# Patient Record
Sex: FEMALE | Race: WHITE | NOT HISPANIC OR LATINO | Employment: UNEMPLOYED | URBAN - METROPOLITAN AREA
[De-identification: names, ages, dates, MRNs, and addresses within clinical notes are randomized per-mention and may not be internally consistent; named-entity substitution may affect disease eponyms.]

---

## 2017-01-17 ENCOUNTER — ALLSCRIPTS OFFICE VISIT (OUTPATIENT)
Dept: OTHER | Facility: OTHER | Age: 37
End: 2017-01-17

## 2017-08-07 ENCOUNTER — TRANSCRIBE ORDERS (OUTPATIENT)
Dept: ADMINISTRATIVE | Age: 37
End: 2017-08-07

## 2017-08-07 ENCOUNTER — APPOINTMENT (OUTPATIENT)
Dept: LAB | Age: 37
End: 2017-08-07

## 2017-08-07 DIAGNOSIS — Z00.8 HEALTH EXAMINATION IN POPULATION SURVEY: ICD-10-CM

## 2017-08-07 DIAGNOSIS — Z00.8 HEALTH EXAMINATION IN POPULATION SURVEY: Primary | ICD-10-CM

## 2017-08-07 LAB
CHOLEST SERPL-MCNC: 219 MG/DL (ref 50–200)
EST. AVERAGE GLUCOSE BLD GHB EST-MCNC: 91 MG/DL
HBA1C MFR BLD: 4.8 % (ref 4.2–6.3)
HDLC SERPL-MCNC: 47 MG/DL (ref 40–60)
LDLC SERPL CALC-MCNC: 146 MG/DL (ref 0–100)
TRIGL SERPL-MCNC: 132 MG/DL

## 2017-08-07 PROCEDURE — 80061 LIPID PANEL: CPT

## 2017-08-07 PROCEDURE — 36415 COLL VENOUS BLD VENIPUNCTURE: CPT

## 2017-08-07 PROCEDURE — 83036 HEMOGLOBIN GLYCOSYLATED A1C: CPT

## 2017-10-09 ENCOUNTER — ALLSCRIPTS OFFICE VISIT (OUTPATIENT)
Dept: OTHER | Facility: OTHER | Age: 37
End: 2017-10-09

## 2018-01-13 VITALS
DIASTOLIC BLOOD PRESSURE: 64 MMHG | WEIGHT: 152 LBS | SYSTOLIC BLOOD PRESSURE: 122 MMHG | BODY MASS INDEX: 24.43 KG/M2 | RESPIRATION RATE: 16 BRPM | HEART RATE: 80 BPM | TEMPERATURE: 98.3 F | HEIGHT: 66 IN

## 2018-01-13 NOTE — PROGRESS NOTES
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)   6  Need for immunization against influenza (V04 81) (Z23)   7  Need for tetanus booster (V03 7) (Z23)   8  PPD screening test (V74 1) (Z11 1)    Current Meds   1  Alesse (21) 0 1-20 MG-MCG TABS; Take 1 tablet daily Recorded    Allergies    1  No Known Drug Allergies    2   Seasonal    Plan  Need for MMR vaccine    · MMR    Future Appointments    Date/Time Provider Specialty Site   02/11/2016 03:40 PM Renay MONGE, Nurse Schedule  FAMILY [facility]     Signatures   Electronically signed by : Maci Carrillo, ; Jan 11 2016 11:32AM EST                       (Author)    Electronically signed by : Alec Carranza DO; Jan 11 2016 11:46AM EST                       (Author)

## 2018-01-13 NOTE — MISCELLANEOUS
Message  Return to work or school:        PLEASE EXCUSE PATIENT FROM WORK DUE TO [facility]  DR LOPEZ,DO/TML        Signatures   Electronically signed by : Veronica Bermudez, ; Apr 19 2016 12:14PM EST                       (Author)

## 2018-01-16 NOTE — PROGRESS NOTES
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)   7  Need for immunization against influenza (V04 81) (Z23)   8  Need for tetanus booster (V03 7) (Z23)   9  PPD screening test (V74 1) (Z11 1)    Current Meds   1  Alesse (21) 0 1-20 MG-MCG TABS; Take 1 tablet daily Recorded   2  Aviane 0 1-20 MG-MCG Oral Tablet; Take 1 tablet daily; Therapy: 70ZDP6721 to (Last Rx:54Cry0297)  Requested for: 24WZH4405 Ordered   3  [place]-28 0 15-30 MG-MCG Oral Tablet; TAKE 1 TABLET DAILY; Therapy: 61XTZ2877 to (Evaluate:42Ahg6892)  Requested for: 34YXF9068; Last   Rx:20Evx6677 Ordered    Allergies    1  No Known Drug Allergies    2   Seasonal    Plan  Need for immunization against influenza    · Fluzone Quadrivalent Intramuscular Suspension    Signatures   Electronically signed by : Rashad Oh, ; Oct  9 2017 11:21AM EST                       (Author)    Electronically signed by : Minal Purvis MD; Oct  9 2017 12:41PM EST                       (Author)

## 2018-01-18 NOTE — PROGRESS NOTES
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)   6  Need for immunization against influenza (V04 81) (Z23)   7  Need for MMR vaccine (V06 4) (Z23)   8  Need for tetanus booster (V03 7) (Z23)   9  PPD screening test (V74 1) (Z11 1)    Current Meds   1  Alesse (21) 0 1-20 MG-MCG TABS; Take 1 tablet daily Recorded   2  Aviane 0 1-20 MG-MCG Oral Tablet; Take 1 tablet daily; Therapy: 98ZHO3447 to (Last Rx:44Hwm7670)  Requested for: 53ZDA7231 Ordered    Allergies    1  No Known Drug Allergies    2   Seasonal    Plan  Need for MMR vaccine    · MMR    Future Appointments    Date/Time Provider Specialty Site   03/02/2016 05:30 PM Marianela Gross, [facility]Gynecology [facility]     Signatures   Electronically signed by : Conchita Mg, ; Feb 11 2016  4:11PM EST                       (Author)    Electronically signed by : Jenae Silverman DO; Feb 12 2016  8:01AM EST                       (Author)

## 2018-06-02 ENCOUNTER — HOSPITAL ENCOUNTER (EMERGENCY)
Facility: HOSPITAL | Age: 38
Discharge: HOME | End: 2018-06-02
Admitting: EMERGENCY MEDICINE
Payer: COMMERCIAL

## 2018-06-02 ENCOUNTER — APPOINTMENT (EMERGENCY)
Dept: RADIOLOGY | Facility: HOSPITAL | Age: 38
End: 2018-06-02
Payer: COMMERCIAL

## 2018-06-02 VITALS
WEIGHT: 150 LBS | DIASTOLIC BLOOD PRESSURE: 50 MMHG | BODY MASS INDEX: 24.21 KG/M2 | TEMPERATURE: 98.2 F | OXYGEN SATURATION: 99 % | RESPIRATION RATE: 18 BRPM | HEART RATE: 85 BPM | SYSTOLIC BLOOD PRESSURE: 117 MMHG

## 2018-06-02 DIAGNOSIS — R07.89 CHEST WALL PAIN: Primary | ICD-10-CM

## 2018-06-02 LAB — EXT PREG TEST URINE: NEGATIVE

## 2018-06-02 PROCEDURE — 99283 EMERGENCY DEPT VISIT LOW MDM: CPT

## 2018-06-02 PROCEDURE — 81025 URINE PREGNANCY TEST: CPT | Performed by: PHYSICIAN ASSISTANT

## 2018-06-02 PROCEDURE — 71101 X-RAY EXAM UNILAT RIBS/CHEST: CPT

## 2018-06-02 NOTE — ED PROVIDER NOTES
History  Chief Complaint   Patient presents with    Mass     Pt was getting out of the shower today and felt what she describes as a mass over her left rib  Not painful  Patient is a 52-year-old female who presents to the emergency department due to a lump on her lower left anterior rib cages she just noticed this morning  She has no pain she had no new recent trauma  She denies fevers chills cough shortness of breath month this chest pain palpitations dominant pain nausea vomiting            None       History reviewed  No pertinent past medical history  History reviewed  No pertinent surgical history  History reviewed  No pertinent family history  I have reviewed and agree with the history as documented  Social History   Substance Use Topics    Smoking status: Current Every Day Smoker     Packs/day: 0 50     Types: Cigarettes    Smokeless tobacco: Never Used    Alcohol use No        Review of Systems   Constitutional: Negative for activity change, appetite change, chills, fever and unexpected weight change  Respiratory: Negative for cough, choking, chest tightness, shortness of breath and stridor  Cardiovascular: Negative for chest pain  Gastrointestinal: Negative for abdominal pain  Endocrine: Negative for cold intolerance  Genitourinary: Negative for dysuria, flank pain and frequency  Musculoskeletal: Negative for arthralgias, back pain, neck pain and neck stiffness  Skin: Negative for rash  Neurological: Negative for dizziness, weakness and numbness  Physical Exam  Physical Exam   Constitutional: She appears well-developed and well-nourished  HENT:   Head: Normocephalic and atraumatic  Eyes: EOM are normal  Pupils are equal, round, and reactive to light  Neck: Normal range of motion  Neck supple  Cardiovascular: Normal rate, regular rhythm and normal heart sounds  Exam reveals no gallop and no friction rub  No murmur heard    Pulmonary/Chest: Effort normal and breath sounds normal  No respiratory distress  She has no wheezes  She has no rales  She exhibits no tenderness  Abdominal: Soft  Bowel sounds are normal  She exhibits no distension and no mass  There is no tenderness  There is no rebound and no guarding  No hernia  Musculoskeletal:        Arms:  Skin: Skin is warm and dry  Psychiatric: She has a normal mood and affect  Her behavior is normal        Vital Signs  ED Triage Vitals   Temperature Pulse Respirations Blood Pressure SpO2   06/02/18 1049 06/02/18 1049 06/02/18 1049 06/02/18 1049 06/02/18 1049   98 2 °F (36 8 °C) 91 18 116/58 99 %      Temp Source Heart Rate Source Patient Position - Orthostatic VS BP Location FiO2 (%)   06/02/18 1049 06/02/18 1049 06/02/18 1049 06/02/18 1204 --   Tympanic Monitor Sitting Right arm       Pain Score       06/02/18 1049       No Pain           Vitals:    06/02/18 1049 06/02/18 1204   BP: 116/58 117/50   Pulse: 91 85   Patient Position - Orthostatic VS: Sitting Sitting       Visual Acuity      ED Medications  Medications - No data to display    Diagnostic Studies  Results Reviewed     Procedure Component Value Units Date/Time    POCT pregnancy, urine [[id number]]  (Normal) Resulted:  06/02/18 1226    Lab Status:  Final result Updated:  06/02/18 1226     EXT PREG TEST UR (Ref: Negative) Negative                 XR ribs with pa chest min 3 views LEFT    (Results Pending)              Procedures  Procedures       Phone Contacts  ED Phone Contact    ED Course                               MDM  Number of Diagnoses or Management Options  Diagnosis management comments: Patient to ED with prominence of the left lower anterior rib cage no trauma no pain no other constitutional symptoms   On physical exam she has mild increased prominence on the left as compared to the right better or as otherwise no abnormal findings on physical exam   Three views of the left anterior ribs and PA chest was obtained they are negative for any acute osseous abnormality  Explained the patient that this is most likely her normal anatomy as this is where the ribs come into the cartilage  Instructed her to take Motrin as needed for any sort of discomfort if she notice any increase in her symptoms follow-up with her family doctor for further evaluation  Additional return precautions anticipatory guidance was discussed patient verbalized understanding  CritCare Time    Disposition  Final diagnoses:   Chest wall pain     Time reflects when diagnosis was documented in both MDM as applicable and the Disposition within this note     Time User Action Codes Description Comment    6/2/2018  1:02 PM Harper Goins Add [R07 89] Chest wall pain       ED Disposition     ED Disposition Condition Comment    Discharge  [facility] discharge to home/self care  Condition at discharge: Stable        Follow-up Information     Follow up With Specialties Details Why Contact Info      In 1 week            Patient's Medications    No medications on file     No discharge procedures on file      ED Provider  Electronically Signed by           Evelyn Gama PA-C  06/02/18 Via Sophy Alaniz PA-C  06/02/18 [id number]

## 2018-06-02 NOTE — DISCHARGE INSTRUCTIONS
Chest Wall Pain   WHAT YOU NEED TO KNOW:   Chest wall pain may be caused by problems with the muscles, cartilage, or bones of the chest wall  Chest wall pain may also be caused by pain that spreads to your chest from another part of your body  The pain may be aching, severe, dull, or sharp  It may come and go, or it may be constant  The pain may be worse when you move in certain ways, breathe deeply, or cough  DISCHARGE INSTRUCTIONS:   Call 911 if:   · You have any of the following signs of a heart attack:      ¨ Squeezing, pressure, or pain in your chest that lasts longer than 5 minutes or returns    ¨ Discomfort or pain in your back, neck, jaw, stomach, or arm     ¨ Trouble breathing    ¨ Nausea or vomiting    ¨ Lightheadedness or a sudden cold sweat, especially with chest pain or trouble breathing    Return to the emergency department if:   · You have severe pain  Contact your healthcare provider if:   · You develop a rash  · You have other new symptoms  · Your pain does not improve, even with treatment  · You have questions or concerns about your condition or care  Medicines: You may need any of the following:  · NSAIDs , such as ibuprofen, help decrease swelling, pain, and fever  This medicine is available with or without a doctor's order  NSAIDs can cause stomach bleeding or kidney problems in certain people  If you take blood thinner medicine, always ask your healthcare provider if NSAIDs are safe for you  Always read the medicine label and follow directions  · Acetaminophen  decreases pain  It is available without a doctor's order  Ask how much to take and how often to take it  Follow directions  Acetaminophen can cause liver damage if not taken correctly  · A cream  may be applied to your chest to decrease pain  · Take your medicine as directed  Contact your healthcare provider if you think your medicine is not helping or if you have side effects   Tell him of her if you are allergic to any medicine  Keep a list of the medicines, vitamins, and herbs you take  Include the amounts, and when and why you take them  Bring the list or the pill bottles to follow-up visits  Carry your medicine list with you in case of an emergency  Follow up with your healthcare provider as directed:  Write down your questions so you remember to ask them during your visits  Self-care:   · Rest  as needed  Avoid activities that make your chest wall pain worse  · Apply heat  on your chest for 20 to 30 minutes every 2 hours for as many days as directed  Heat helps decrease pain and muscle spasms  · Apply ice  on your chest for 15 to 20 minutes every hour or as directed  Use an ice pack, or put crushed ice in a plastic bag  Cover it with a towel  Ice helps prevent tissue damage and decreases swelling and pain  © 2017 [employer]  Information is for End User's use only and may not be sold, redistributed or otherwise used for commercial purposes  All illustrations and images included in CareNotes® are the copyrighted property of A D A M , Inc  or [employer]  The above information is an  only  It is not intended as medical advice for individual conditions or treatments  Talk to your doctor, nurse or pharmacist before following any medical regimen to see if it is safe and effective for you

## 2018-06-08 ENCOUNTER — OFFICE VISIT (OUTPATIENT)
Dept: FAMILY MEDICINE CLINIC | Facility: CLINIC | Age: 38
End: 2018-06-08
Payer: COMMERCIAL

## 2018-06-08 VITALS
SYSTOLIC BLOOD PRESSURE: 122 MMHG | OXYGEN SATURATION: 99 % | HEART RATE: 76 BPM | WEIGHT: 146 LBS | DIASTOLIC BLOOD PRESSURE: 74 MMHG | HEIGHT: 66 IN | BODY MASS INDEX: 23.46 KG/M2 | RESPIRATION RATE: 16 BRPM

## 2018-06-08 DIAGNOSIS — Z13.220 SCREENING FOR LIPID DISORDERS: ICD-10-CM

## 2018-06-08 DIAGNOSIS — Q67.8 CHEST WALL ASYMMETRY: Primary | ICD-10-CM

## 2018-06-08 DIAGNOSIS — Z13.1 DIABETES MELLITUS SCREENING: ICD-10-CM

## 2018-06-08 PROCEDURE — 99213 OFFICE O/P EST LOW 20 MIN: CPT | Performed by: FAMILY MEDICINE

## 2018-06-08 PROCEDURE — 3008F BODY MASS INDEX DOCD: CPT | Performed by: FAMILY MEDICINE

## 2018-06-08 NOTE — ASSESSMENT & PLAN NOTE
Patient given reassurance that there is no palpable abnormality  Radiographically this was normal on her rib series  Even though she is a smoker this would not contribute to any palpable abnormality  She should still quit smoking and was advised of this  She is well aware  I did offer to order a soft tissue ultrasound of the left chest wall but strongly suspect that there will be no significant findings whatsoever  Patient declined at this time    She states that she will continue to monitor the area and if it does become more prominent or becomes uncomfortable she will contact the office for further testing

## 2018-06-08 NOTE — PROGRESS NOTES
Subjective:      Patient ID: Dorina Goins is a 41 y o  female  Patient presents for re-evaluation of left-sided chest wall asymmetry  Patient was seen at [facility] Emergency room after she noticed irregularity in the contour of her left-sided chest wall  She did not have any pain but this was concerning to her because she is a smoker  She is trying to quit smoking and her children her reinforcing this with her however because she was a smoker she felt the abnormal contour of her chest wall and became concerned  At the emergency room she did have a rib series performed and a clinical assessment done  Rib series was completely normal   Assessment of the lungs was also completely normal   Patient has no pain  She still feels the palpable abnormality along the lateral aspect of her left ribcage  No shortness of breath  No bruising  Past Medical History:   Diagnosis Date    Viral gastroenteritis     [id number] RESOLVED       Family History   Problem Relation Age of Onset    No Known Problems Mother     No Known Problems Father        Past Surgical History:   Procedure Laterality Date    TOOTH EXTRACTION          reports that she has been smoking Cigarettes  She has been smoking about 0 50 packs per day  She has never used smokeless tobacco  She reports that she does not drink alcohol or use drugs  No current outpatient prescriptions on file  The following portions of the patient's history were reviewed and updated as appropriate: allergies, current medications, past family history, past medical history, past social history, past surgical history and problem list     Review of Systems   Constitutional: Negative  Respiratory: Negative  Cardiovascular: Negative  Negative for chest pain     Musculoskeletal:        Abnormality of the contour of the left chest wall           Objective:    /74   Pulse 76   Resp 16   Ht 5' 6" (1 676 m)   Wt 66 2 kg (146 lb)   SpO2 99%   BMI 23 57 kg/m²      Physical Exam   Constitutional: She appears well-developed and well-nourished  Neck: Normal range of motion  Neck supple  No thyromegaly present  Cardiovascular: Normal rate, regular rhythm and normal heart sounds  Pulmonary/Chest: Effort normal and breath sounds normal        Nursing note and vitals reviewed  Recent Results (from the past 1008 hour(s))   POCT pregnancy, urine    Collection Time: 06/02/18 12:26 PM   Result Value Ref Range    EXT PREG TEST UR (Ref: Negative) Negative        Assessment/Plan:    No problem-specific Assessment & Plan notes found for this encounter  Problem List Items Addressed This Visit     Chest wall asymmetry - Primary      Patient given reassurance that there is no palpable abnormality  Radiographically this was normal on her rib series  Even though she is a smoker this would not contribute to any palpable abnormality  She should still quit smoking and was advised of this  She is well aware  I did offer to order a soft tissue ultrasound of the left chest wall but strongly suspect that there will be no significant findings whatsoever  Patient declined at this time    She states that she will continue to monitor the area and if it does become more prominent or becomes uncomfortable she will contact the office for further testing         Screening for lipid disorders      Screening lipid profile as required by insurance provider         Relevant Orders    Lipid panel    Diabetes mellitus screening       Screening hemoglobin A1c as required by insurance provider         Relevant Orders    Hemoglobin A1C

## 2018-07-28 ENCOUNTER — APPOINTMENT (OUTPATIENT)
Dept: LAB | Age: 38
End: 2018-07-28
Payer: COMMERCIAL

## 2018-07-28 ENCOUNTER — TRANSCRIBE ORDERS (OUTPATIENT)
Dept: ADMINISTRATIVE | Age: 38
End: 2018-07-28

## 2018-07-28 DIAGNOSIS — Z00.8 HEALTH EXAMINATION IN POPULATION SURVEY: ICD-10-CM

## 2018-07-28 DIAGNOSIS — Z00.8 HEALTH EXAMINATION IN POPULATION SURVEY: Primary | ICD-10-CM

## 2018-07-28 LAB
CHOLEST SERPL-MCNC: 202 MG/DL (ref 50–200)
EST. AVERAGE GLUCOSE BLD GHB EST-MCNC: 80 MG/DL
HBA1C MFR BLD: 4.4 % (ref 4.2–6.3)
HDLC SERPL-MCNC: 38 MG/DL (ref 40–60)
LDLC SERPL CALC-MCNC: 133 MG/DL (ref 0–100)
NONHDLC SERPL-MCNC: 164 MG/DL
TRIGL SERPL-MCNC: 153 MG/DL

## 2018-07-28 PROCEDURE — 83036 HEMOGLOBIN GLYCOSYLATED A1C: CPT

## 2018-07-28 PROCEDURE — 80061 LIPID PANEL: CPT

## 2018-07-28 PROCEDURE — 36415 COLL VENOUS BLD VENIPUNCTURE: CPT

## 2018-10-16 ENCOUNTER — IMMUNIZATION (OUTPATIENT)
Dept: FAMILY MEDICINE CLINIC | Facility: CLINIC | Age: 38
End: 2018-10-16
Payer: COMMERCIAL

## 2018-10-16 DIAGNOSIS — Z23 ENCOUNTER FOR IMMUNIZATION: ICD-10-CM

## 2018-10-16 PROCEDURE — 90471 IMMUNIZATION ADMIN: CPT

## 2018-10-16 PROCEDURE — 90686 IIV4 VACC NO PRSV 0.5 ML IM: CPT

## 2019-09-26 ENCOUNTER — OFFICE VISIT (OUTPATIENT)
Dept: FAMILY MEDICINE CLINIC | Facility: CLINIC | Age: 39
End: 2019-09-26
Payer: COMMERCIAL

## 2019-09-26 VITALS
OXYGEN SATURATION: 99 % | BODY MASS INDEX: 23.01 KG/M2 | TEMPERATURE: 98.8 F | SYSTOLIC BLOOD PRESSURE: 120 MMHG | HEART RATE: 89 BPM | WEIGHT: 143.2 LBS | RESPIRATION RATE: 16 BRPM | HEIGHT: 66 IN | DIASTOLIC BLOOD PRESSURE: 70 MMHG

## 2019-09-26 DIAGNOSIS — Z72.0 TOBACCO USE: ICD-10-CM

## 2019-09-26 DIAGNOSIS — J00 ACUTE NASOPHARYNGITIS: Primary | ICD-10-CM

## 2019-09-26 PROCEDURE — 99214 OFFICE O/P EST MOD 30 MIN: CPT | Performed by: PHYSICIAN ASSISTANT

## 2019-09-26 PROCEDURE — 3008F BODY MASS INDEX DOCD: CPT | Performed by: PHYSICIAN ASSISTANT

## 2019-09-26 RX ORDER — AMOXICILLIN 875 MG/1
875 TABLET, COATED ORAL 2 TIMES DAILY
Qty: 10 TABLET | Refills: 0 | Status: SHIPPED | OUTPATIENT
Start: 2019-09-26 | End: 2019-10-01

## 2019-09-26 NOTE — PROGRESS NOTES
Assessment/Plan:     Diagnoses and all orders for this visit:    Acute nasopharyngitis  Aferbile  Lungs clear to auscultation  Symptoms have persisted for 2 weeks  Increased level of concern due to smoking status  - Started on Amoxicillin due to duration and smoking history  - advised to continue OTC supportive care with Tylenol/Motrin, Mucinex and saline gargle   - encouraged rest and fluid intake   - educated Pt that cough can take 10-14 days total to resolve  - directed to return if fever over 102, symptoms persist for 14 days, thick productive cough  - Patient will FU in 5 days with update  Tobacco use  Pt is in the Precontemplation stage (ie, not wanting to quit)  - Educate on the negative effects of Tobacco   - Recommend quitting smoking  - Listed cessation options        Subjective:    Patient ID: Shiraz Cervantes is a 46 y o  female  Pt is presenting today for Productive cough, sore throat, nasal congestion and dizziness for 2 weeks  She has been using Nyquil, Mucinex and Robitusson with minimal improvement    Her dizziness has resolved  She had esophogeal pain which started after swallowing a large pill and having it catch  She has continues to have intermittent throat pain when having coughing fits for 2 days  The throat pain feel slightly improved last night  No palpitations, SOB, CP with activity, or family history of sudden cardiac death  Her children have a mild cough with no fevers  She is a smoker for almost 20 years, a nearly 1 PPD  She wants to quit in 2020    URI    There has been no fever  Associated symptoms include congestion, coughing (productive), headaches, sinus pain (mild) and a sore throat (improving)  Pertinent negatives include no diarrhea, ear pain, nausea, plugged ear sensation, rhinorrhea, vomiting or wheezing  She has tried acetaminophen and NSAIDs (Mucinex, Nyquil, Robutussin) for the symptoms       The following portions of the patient's history were reviewed and updated as appropriate: allergies, current medications and problem list     Review of Systems   HENT: Positive for congestion, sinus pain (mild) and sore throat (improving)  Negative for ear pain and rhinorrhea  Respiratory: Positive for cough (productive)  Negative for wheezing  Gastrointestinal: Negative for diarrhea, nausea and vomiting  Neurological: Positive for headaches  Objective:  /70   Pulse 89   Temp 98 8 °F (37 1 °C)   Resp 16   Ht 5' 6" (1 676 m)   Wt 65 kg (143 lb 3 2 oz)   SpO2 99%   BMI 23 11 kg/m²      Physical Exam   Constitutional: She is oriented to person, place, and time  She appears well-developed and well-nourished  No distress  HENT:   Head: Normocephalic and atraumatic  Right Ear: Tympanic membrane, external ear and ear canal normal    Left Ear: External ear and ear canal normal  A middle ear effusion is present  Nose: No rhinorrhea  Right sinus exhibits no maxillary sinus tenderness and no frontal sinus tenderness  Left sinus exhibits no maxillary sinus tenderness and no frontal sinus tenderness  Mouth/Throat: Oropharynx is clear and moist  No oropharyngeal exudate or posterior oropharyngeal erythema  Eyes: Pupils are equal, round, and reactive to light  Neck: Normal range of motion  Neck supple  Cardiovascular: Normal rate, regular rhythm, normal heart sounds and intact distal pulses  Exam reveals no gallop and no friction rub  No murmur heard  Pulmonary/Chest: Effort normal and breath sounds normal  No respiratory distress  She has no wheezes  She has no rales  Lymphadenopathy:     She has no cervical adenopathy  Neurological: She is alert and oriented to person, place, and time  Skin: She is not diaphoretic  Psychiatric: She has a normal mood and affect  Her behavior is normal  Thought content normal    Vitals reviewed

## 2019-10-30 ENCOUNTER — IMMUNIZATIONS (OUTPATIENT)
Dept: FAMILY MEDICINE CLINIC | Facility: CLINIC | Age: 39
End: 2019-10-30
Payer: COMMERCIAL

## 2019-10-30 DIAGNOSIS — Z23 ENCOUNTER FOR IMMUNIZATION: ICD-10-CM

## 2019-10-30 PROCEDURE — 90471 IMMUNIZATION ADMIN: CPT

## 2019-10-30 PROCEDURE — 90686 IIV4 VACC NO PRSV 0.5 ML IM: CPT

## 2020-11-06 ENCOUNTER — IMMUNIZATIONS (OUTPATIENT)
Dept: FAMILY MEDICINE CLINIC | Facility: CLINIC | Age: 40
End: 2020-11-06
Payer: COMMERCIAL

## 2020-11-06 DIAGNOSIS — Z23 ENCOUNTER FOR IMMUNIZATION: ICD-10-CM

## 2020-11-06 PROCEDURE — 90471 IMMUNIZATION ADMIN: CPT

## 2020-11-06 PROCEDURE — 90686 IIV4 VACC NO PRSV 0.5 ML IM: CPT

## 2020-12-03 ENCOUNTER — OFFICE VISIT (OUTPATIENT)
Dept: FAMILY MEDICINE CLINIC | Facility: CLINIC | Age: 40
End: 2020-12-03
Payer: COMMERCIAL

## 2020-12-03 VITALS
RESPIRATION RATE: 16 BRPM | HEART RATE: 70 BPM | WEIGHT: 150 LBS | BODY MASS INDEX: 24.11 KG/M2 | DIASTOLIC BLOOD PRESSURE: 68 MMHG | HEIGHT: 66 IN | OXYGEN SATURATION: 98 % | SYSTOLIC BLOOD PRESSURE: 122 MMHG

## 2020-12-03 DIAGNOSIS — Z13.1 DIABETES MELLITUS SCREENING: ICD-10-CM

## 2020-12-03 DIAGNOSIS — Z00.00 PHYSICAL EXAM, ANNUAL: Primary | ICD-10-CM

## 2020-12-03 DIAGNOSIS — Z13.220 SCREENING FOR LIPID DISORDERS: ICD-10-CM

## 2020-12-03 PROCEDURE — 99395 PREV VISIT EST AGE 18-39: CPT | Performed by: FAMILY MEDICINE

## 2021-03-31 DIAGNOSIS — Z23 ENCOUNTER FOR IMMUNIZATION: ICD-10-CM

## 2021-04-06 ENCOUNTER — IMMUNIZATIONS (OUTPATIENT)
Dept: FAMILY MEDICINE CLINIC | Facility: HOSPITAL | Age: 41
End: 2021-04-06

## 2021-04-06 DIAGNOSIS — Z23 ENCOUNTER FOR IMMUNIZATION: Primary | ICD-10-CM

## 2021-04-06 PROCEDURE — 91300 SARS-COV-2 / COVID-19 MRNA VACCINE (PFIZER-BIONTECH) 30 MCG: CPT

## 2021-04-06 PROCEDURE — 0001A SARS-COV-2 / COVID-19 MRNA VACCINE (PFIZER-BIONTECH) 30 MCG: CPT

## 2021-04-27 ENCOUNTER — IMMUNIZATIONS (OUTPATIENT)
Dept: FAMILY MEDICINE CLINIC | Facility: HOSPITAL | Age: 41
End: 2021-04-27

## 2021-04-27 DIAGNOSIS — Z23 ENCOUNTER FOR IMMUNIZATION: Primary | ICD-10-CM

## 2021-04-27 PROCEDURE — 91300 SARS-COV-2 / COVID-19 MRNA VACCINE (PFIZER-BIONTECH) 30 MCG: CPT

## 2021-04-27 PROCEDURE — 0002A SARS-COV-2 / COVID-19 MRNA VACCINE (PFIZER-BIONTECH) 30 MCG: CPT

## 2021-05-13 ENCOUNTER — APPOINTMENT (OUTPATIENT)
Dept: LAB | Facility: CLINIC | Age: 41
End: 2021-05-13
Payer: COMMERCIAL

## 2021-05-13 ENCOUNTER — TRANSCRIBE ORDERS (OUTPATIENT)
Dept: LAB | Facility: CLINIC | Age: 41
End: 2021-05-13

## 2021-05-13 ENCOUNTER — APPOINTMENT (OUTPATIENT)
Dept: LAB | Facility: CLINIC | Age: 41
End: 2021-05-13

## 2021-05-13 DIAGNOSIS — D50.9 NORMOCYTIC HYPOCHROMIC ANEMIA: ICD-10-CM

## 2021-05-13 DIAGNOSIS — Z00.8 ENCOUNTER FOR OTHER GENERAL EXAMINATION: Primary | ICD-10-CM

## 2021-05-13 DIAGNOSIS — Z13.220 SCREENING FOR LIPID DISORDERS: ICD-10-CM

## 2021-05-13 DIAGNOSIS — Z00.00 PHYSICAL EXAM, ANNUAL: ICD-10-CM

## 2021-05-13 DIAGNOSIS — Z13.1 DIABETES MELLITUS SCREENING: ICD-10-CM

## 2021-05-13 DIAGNOSIS — Z00.8 ENCOUNTER FOR OTHER GENERAL EXAMINATION: ICD-10-CM

## 2021-05-13 LAB
ALBUMIN SERPL BCP-MCNC: 4 G/DL (ref 3.5–5)
ALP SERPL-CCNC: 47 U/L (ref 46–116)
ALT SERPL W P-5'-P-CCNC: 77 U/L (ref 12–78)
ANION GAP SERPL CALCULATED.3IONS-SCNC: 4 MMOL/L (ref 4–13)
AST SERPL W P-5'-P-CCNC: 202 U/L (ref 5–45)
BASOPHILS # BLD AUTO: 0.09 THOUSANDS/ΜL (ref 0–0.1)
BASOPHILS NFR BLD AUTO: 1 % (ref 0–1)
BILIRUB SERPL-MCNC: 0.58 MG/DL (ref 0.2–1)
BUN SERPL-MCNC: 10 MG/DL (ref 5–25)
CALCIUM SERPL-MCNC: 8.8 MG/DL (ref 8.3–10.1)
CHLORIDE SERPL-SCNC: 111 MMOL/L (ref 100–108)
CHOLEST SERPL-MCNC: 217 MG/DL (ref 50–200)
CO2 SERPL-SCNC: 24 MMOL/L (ref 21–32)
CREAT SERPL-MCNC: 0.7 MG/DL (ref 0.6–1.3)
EOSINOPHIL # BLD AUTO: 0.14 THOUSAND/ΜL (ref 0–0.61)
EOSINOPHIL NFR BLD AUTO: 1 % (ref 0–6)
ERYTHROCYTE [DISTWIDTH] IN BLOOD BY AUTOMATED COUNT: 17.2 % (ref 11.6–15.1)
EST. AVERAGE GLUCOSE BLD GHB EST-MCNC: 82 MG/DL
GFR SERPL CREATININE-BSD FRML MDRD: 109 ML/MIN/1.73SQ M
GLUCOSE P FAST SERPL-MCNC: 79 MG/DL (ref 65–99)
HBA1C MFR BLD: 4.5 %
HCT VFR BLD AUTO: 37.7 % (ref 34.8–46.1)
HDLC SERPL-MCNC: 50 MG/DL
HGB BLD-MCNC: 11.5 G/DL (ref 11.5–15.4)
IMM GRANULOCYTES # BLD AUTO: 0.07 THOUSAND/UL (ref 0–0.2)
IMM GRANULOCYTES NFR BLD AUTO: 1 % (ref 0–2)
LDLC SERPL CALC-MCNC: 152 MG/DL (ref 0–100)
LYMPHOCYTES # BLD AUTO: 2.68 THOUSANDS/ΜL (ref 0.6–4.47)
LYMPHOCYTES NFR BLD AUTO: 21 % (ref 14–44)
MCH RBC QN AUTO: 20.1 PG (ref 26.8–34.3)
MCHC RBC AUTO-ENTMCNC: 30.5 G/DL (ref 31.4–37.4)
MCV RBC AUTO: 66 FL (ref 82–98)
MONOCYTES # BLD AUTO: 1 THOUSAND/ΜL (ref 0.17–1.22)
MONOCYTES NFR BLD AUTO: 8 % (ref 4–12)
NEUTROPHILS # BLD AUTO: 8.79 THOUSANDS/ΜL (ref 1.85–7.62)
NEUTS SEG NFR BLD AUTO: 68 % (ref 43–75)
NONHDLC SERPL-MCNC: 167 MG/DL
NRBC BLD AUTO-RTO: 0 /100 WBCS
PLATELET # BLD AUTO: 265 THOUSANDS/UL (ref 149–390)
PMV BLD AUTO: 10.8 FL (ref 8.9–12.7)
POTASSIUM SERPL-SCNC: 4.3 MMOL/L (ref 3.5–5.3)
PROT SERPL-MCNC: 7.2 G/DL (ref 6.4–8.2)
RBC # BLD AUTO: 5.72 MILLION/UL (ref 3.81–5.12)
SODIUM SERPL-SCNC: 139 MMOL/L (ref 136–145)
TRIGL SERPL-MCNC: 75 MG/DL
TSH SERPL DL<=0.05 MIU/L-ACNC: 0.48 UIU/ML (ref 0.36–3.74)
WBC # BLD AUTO: 12.77 THOUSAND/UL (ref 4.31–10.16)

## 2021-05-13 PROCEDURE — 80053 COMPREHEN METABOLIC PANEL: CPT

## 2021-05-13 PROCEDURE — 85025 COMPLETE CBC W/AUTO DIFF WBC: CPT

## 2021-05-13 PROCEDURE — 80061 LIPID PANEL: CPT

## 2021-05-13 PROCEDURE — 83036 HEMOGLOBIN GLYCOSYLATED A1C: CPT

## 2021-05-13 PROCEDURE — 84443 ASSAY THYROID STIM HORMONE: CPT

## 2021-05-13 PROCEDURE — 36415 COLL VENOUS BLD VENIPUNCTURE: CPT

## 2021-05-19 ENCOUNTER — OFFICE VISIT (OUTPATIENT)
Dept: FAMILY MEDICINE CLINIC | Facility: CLINIC | Age: 41
End: 2021-05-19
Payer: COMMERCIAL

## 2021-05-19 ENCOUNTER — APPOINTMENT (OUTPATIENT)
Dept: LAB | Facility: CLINIC | Age: 41
End: 2021-05-19
Payer: COMMERCIAL

## 2021-05-19 VITALS
HEIGHT: 66 IN | SYSTOLIC BLOOD PRESSURE: 114 MMHG | OXYGEN SATURATION: 99 % | HEART RATE: 102 BPM | RESPIRATION RATE: 16 BRPM | WEIGHT: 143 LBS | BODY MASS INDEX: 22.98 KG/M2 | DIASTOLIC BLOOD PRESSURE: 52 MMHG

## 2021-05-19 DIAGNOSIS — D50.9 NORMOCYTIC HYPOCHROMIC ANEMIA: ICD-10-CM

## 2021-05-19 DIAGNOSIS — D50.9 MICROCYTIC HYPOCHROMIC ANEMIA: ICD-10-CM

## 2021-05-19 DIAGNOSIS — E04.1 THYROID NODULE: Primary | ICD-10-CM

## 2021-05-19 DIAGNOSIS — E78.2 MIXED HYPERLIPIDEMIA: ICD-10-CM

## 2021-05-19 DIAGNOSIS — R74.8 ELEVATED LIVER ENZYMES: ICD-10-CM

## 2021-05-19 LAB
ALBUMIN SERPL BCP-MCNC: 4.4 G/DL (ref 3.5–5)
ALP SERPL-CCNC: 53 U/L (ref 46–116)
ALT SERPL W P-5'-P-CCNC: 43 U/L (ref 12–78)
AST SERPL W P-5'-P-CCNC: 28 U/L (ref 5–45)
BASOPHILS # BLD AUTO: 0.1 THOUSANDS/ΜL (ref 0–0.1)
BASOPHILS NFR BLD AUTO: 1 % (ref 0–1)
BILIRUB DIRECT SERPL-MCNC: 0.19 MG/DL (ref 0–0.2)
BILIRUB SERPL-MCNC: 0.96 MG/DL (ref 0.2–1)
EOSINOPHIL # BLD AUTO: 0.09 THOUSAND/ΜL (ref 0–0.61)
EOSINOPHIL NFR BLD AUTO: 1 % (ref 0–6)
ERYTHROCYTE [DISTWIDTH] IN BLOOD BY AUTOMATED COUNT: 17.8 % (ref 11.6–15.1)
FERRITIN SERPL-MCNC: 50 NG/ML (ref 8–388)
HCT VFR BLD AUTO: 39.1 % (ref 34.8–46.1)
HGB BLD-MCNC: 12.2 G/DL (ref 11.5–15.4)
IMM GRANULOCYTES # BLD AUTO: 0.09 THOUSAND/UL (ref 0–0.2)
IMM GRANULOCYTES NFR BLD AUTO: 1 % (ref 0–2)
IRON SATN MFR SERPL: 42 %
IRON SERPL-MCNC: 123 UG/DL (ref 50–170)
LYMPHOCYTES # BLD AUTO: 3.19 THOUSANDS/ΜL (ref 0.6–4.47)
LYMPHOCYTES NFR BLD AUTO: 26 % (ref 14–44)
MCH RBC QN AUTO: 20.6 PG (ref 26.8–34.3)
MCHC RBC AUTO-ENTMCNC: 31.2 G/DL (ref 31.4–37.4)
MCV RBC AUTO: 66 FL (ref 82–98)
MONOCYTES # BLD AUTO: 1.1 THOUSAND/ΜL (ref 0.17–1.22)
MONOCYTES NFR BLD AUTO: 9 % (ref 4–12)
NEUTROPHILS # BLD AUTO: 7.55 THOUSANDS/ΜL (ref 1.85–7.62)
NEUTS SEG NFR BLD AUTO: 62 % (ref 43–75)
NRBC BLD AUTO-RTO: 0 /100 WBCS
PLATELET # BLD AUTO: 389 THOUSANDS/UL (ref 149–390)
PMV BLD AUTO: 10.8 FL (ref 8.9–12.7)
PROT SERPL-MCNC: 7.7 G/DL (ref 6.4–8.2)
RBC # BLD AUTO: 5.92 MILLION/UL (ref 3.81–5.12)
TIBC SERPL-MCNC: 292 UG/DL (ref 250–450)
WBC # BLD AUTO: 12.12 THOUSAND/UL (ref 4.31–10.16)

## 2021-05-19 PROCEDURE — 81404 MOPATH PROCEDURE LEVEL 5: CPT

## 2021-05-19 PROCEDURE — 83550 IRON BINDING TEST: CPT

## 2021-05-19 PROCEDURE — 83020 HEMOGLOBIN ELECTROPHORESIS: CPT

## 2021-05-19 PROCEDURE — 82728 ASSAY OF FERRITIN: CPT

## 2021-05-19 PROCEDURE — 85025 COMPLETE CBC W/AUTO DIFF WBC: CPT

## 2021-05-19 PROCEDURE — 99214 OFFICE O/P EST MOD 30 MIN: CPT | Performed by: FAMILY MEDICINE

## 2021-05-19 PROCEDURE — 36415 COLL VENOUS BLD VENIPUNCTURE: CPT

## 2021-05-19 PROCEDURE — 80076 HEPATIC FUNCTION PANEL: CPT

## 2021-05-19 PROCEDURE — 83540 ASSAY OF IRON: CPT

## 2021-05-19 PROCEDURE — 81364 HBB FULL GENE SEQUENCE: CPT

## 2021-05-19 NOTE — ASSESSMENT & PLAN NOTE
Patient has had longstanding history of hypochromic microcytic indices and anemia in the past   Check iron studies as well as hemoglobin electrophoresis for possible thalassemia    Contact with results

## 2021-05-19 NOTE — PROGRESS NOTES
Subjective:      Patient ID: Alejandro Montgomery is a 45 y o  female  80-year-old female presents for follow-up in review of labs  It is been quite a few years since she actually had a full complement of blood work  Last full set of labs were done when she was pregnant in 2014 lab showed normal TSH, hemoglobin 11 5 with hypochromic, microcytic indices with increased RDW  That has been consistent over the years  In the past she was noted to be iron deficient  , ALT 77 alkaline phosphatase 47  Cholesterol profile is elevated with total cholesterol of 230 and LDL of 152  Patient feels well  She does admit to some dietary indiscretion  She does have younger children and occasionally will eat fried foods or pizza  She feels well  No pain  Drinks 1 beer 3 days out of the week  No more alcohol than that  She is scheduled to see gynecologist, ophthalmologist and dentist      Past Medical History:   Diagnosis Date    Viral gastroenteritis     [id number] RESOLVED       Family History   Problem Relation Age of Onset    No Known Problems Mother     No Known Problems Father        Past Surgical History:   Procedure Laterality Date    TOOTH EXTRACTION          reports that she has been smoking cigarettes  She has a 20 00 pack-year smoking history  She has never used smokeless tobacco  She reports that she does not drink alcohol or use drugs  No current outpatient medications on file  The following portions of the patient's history were reviewed and updated as appropriate: allergies, current medications, past family history, past medical history, past social history, past surgical history and problem list     Review of Systems   Constitutional: Negative  HENT: Negative  Eyes: Negative  Respiratory: Negative  Cardiovascular: Negative  Gastrointestinal: Negative  Endocrine: Negative  Genitourinary: Negative  Musculoskeletal: Negative  Skin: Negative      Allergic/Immunologic: Negative  Neurological: Negative  Hematological: Negative  Psychiatric/Behavioral: Negative  Objective:    /52   Pulse 102   Resp 16   Ht 5' 6" (1 676 m)   Wt 64 9 kg (143 lb)   SpO2 99%   BMI 23 08 kg/m²      Physical Exam  Vitals signs and nursing note reviewed  Constitutional:       General: She is not in acute distress  Appearance: Normal appearance  She is well-developed  She is not diaphoretic  HENT:      Head: Normocephalic and atraumatic  Eyes:      Extraocular Movements: Extraocular movements intact  Conjunctiva/sclera: Conjunctivae normal       Pupils: Pupils are equal, round, and reactive to light  Neck:      Musculoskeletal: Normal range of motion and neck supple  Thyroid: Thyroid mass (Small left-sided thyroid nodule) present  Cardiovascular:      Rate and Rhythm: Normal rate and regular rhythm  Heart sounds: Normal heart sounds  No murmur  Pulmonary:      Effort: Pulmonary effort is normal       Breath sounds: Normal breath sounds  Abdominal:      General: Bowel sounds are normal       Palpations: Abdomen is soft  There is no mass  Musculoskeletal: Normal range of motion  Skin:     General: Skin is warm and dry  Findings: No rash  Neurological:      Mental Status: She is alert and oriented to person, place, and time  Deep Tendon Reflexes: Reflexes are normal and symmetric  Psychiatric:         Mood and Affect: Mood normal          Behavior: Behavior normal          Thought Content: Thought content normal          Judgment: Judgment normal            Recent Results (from the past 1008 hour(s))   Hemoglobin A1C    Collection Time: 05/13/21 10:04 AM   Result Value Ref Range    Hemoglobin A1C 4 5 Normal 3 8-5 6%; PreDiabetic 5 7-6 4%;  Diabetic >=6 5%; Glycemic control for adults with diabetes <7 0% %    EAG 82 mg/dl   Lipid panel    Collection Time: 05/13/21 10:04 AM   Result Value Ref Range    Cholesterol 217 (H) 50 - 200 mg/dL    Triglycerides 75 <=150 mg/dL    HDL, Direct 50 >=40 mg/dL    LDL Calculated 152 (H) 0 - 100 mg/dL    Non-HDL-Chol (CHOL-HDL) 167 mg/dl   CBC and differential    Collection Time: 05/13/21 10:04 AM   Result Value Ref Range    WBC 12 77 (H) 4 31 - 10 16 Thousand/uL    RBC 5 72 (H) 3 81 - 5 12 Million/uL    Hemoglobin 11 5 11 5 - 15 4 g/dL    Hematocrit 37 7 34 8 - 46 1 %    MCV 66 (L) 82 - 98 fL    MCH 20 1 (L) 26 8 - 34 3 pg    MCHC 30 5 (L) 31 4 - 37 4 g/dL    RDW 17 2 (H) 11 6 - 15 1 %    MPV 10 8 8 9 - 12 7 fL    Platelets 661 304 - 720 Thousands/uL    nRBC 0 /100 WBCs    Neutrophils Relative 68 43 - 75 %    Immat GRANS % 1 0 - 2 %    Lymphocytes Relative 21 14 - 44 %    Monocytes Relative 8 4 - 12 %    Eosinophils Relative 1 0 - 6 %    Basophils Relative 1 0 - 1 %    Neutrophils Absolute 8 79 (H) 1 85 - 7 62 Thousands/µL    Immature Grans Absolute 0 07 0 00 - 0 20 Thousand/uL    Lymphocytes Absolute 2 68 0 60 - 4 47 Thousands/µL    Monocytes Absolute 1 00 0 17 - 1 22 Thousand/µL    Eosinophils Absolute 0 14 0 00 - 0 61 Thousand/µL    Basophils Absolute 0 09 0 00 - 0 10 Thousands/µL   Comprehensive metabolic panel    Collection Time: 05/13/21 10:04 AM   Result Value Ref Range    Sodium 139 136 - 145 mmol/L    Potassium 4 3 3 5 - 5 3 mmol/L    Chloride 111 (H) 100 - 108 mmol/L    CO2 24 21 - 32 mmol/L    ANION GAP 4 4 - 13 mmol/L    BUN 10 5 - 25 mg/dL    Creatinine 0 70 0 60 - 1 30 mg/dL    Glucose, Fasting 79 65 - 99 mg/dL    Calcium 8 8 8 3 - 10 1 mg/dL     (H) 5 - 45 U/L    ALT 77 12 - 78 U/L    Alkaline Phosphatase 47 46 - 116 U/L    Total Protein 7 2 6 4 - 8 2 g/dL    Albumin 4 0 3 5 - 5 0 g/dL    Total Bilirubin 0 58 0 20 - 1 00 mg/dL    eGFR 109 ml/min/1 73sq m   TSH, 3rd generation with Free T4 reflex    Collection Time: 05/13/21 10:04 AM   Result Value Ref Range    TSH 3RD GENERATON 0 485 0 358 - 3 740 uIU/mL       Assessment/Plan:    Thyroid nodule  Normal TSH    Check thyroid ultrasound    Mixed hyperlipidemia  Watch dietary intake of fats and cholesterol  Elevated liver enzymes  Check liver ultrasound as well as hepatic function panel    Microcytic hypochromic anemia  Patient has had longstanding history of hypochromic microcytic indices and anemia in the past   Check iron studies as well as hemoglobin electrophoresis for possible thalassemia    Contact with results          Problem List Items Addressed This Visit        Endocrine    Thyroid nodule - Primary     Normal TSH  Check thyroid ultrasound         Relevant Orders    US thyroid       Other    Elevated liver enzymes     Check liver ultrasound as well as hepatic function panel         Relevant Orders    US liver    Hepatic function panel    Microcytic hypochromic anemia     Patient has had longstanding history of hypochromic microcytic indices and anemia in the past   Check iron studies as well as hemoglobin electrophoresis for possible thalassemia    Contact with results         Relevant Orders    Thalassemia and Hemoglobinopathy Comp    Iron Panel (Includes Ferritin, Iron Sat%, Iron, and TIBC)    CBC and differential    Mixed hyperlipidemia     Watch dietary intake of fats and cholesterol

## 2021-05-24 LAB
HGB A MFR BLD: 4.7 % (ref 1.8–3.2)
HGB A MFR BLD: 91 % (ref 96.4–98.8)
HGB F MFR BLD: 4.3 % (ref 0–2)
HGB FRACT BLD-IMP: ABNORMAL
HGB S MFR BLD: 0 %

## 2021-05-26 PROBLEM — D56.3 BETA THALASSEMIA MINOR: Status: ACTIVE | Noted: 2021-05-26

## 2021-05-27 ENCOUNTER — HOSPITAL ENCOUNTER (OUTPATIENT)
Dept: RADIOLOGY | Facility: IMAGING CENTER | Age: 41
Discharge: HOME | End: 2021-05-27
Payer: COMMERCIAL

## 2021-05-27 DIAGNOSIS — R74.8 ELEVATED LIVER ENZYMES: ICD-10-CM

## 2021-05-27 DIAGNOSIS — E04.1 THYROID NODULE: ICD-10-CM

## 2021-05-27 PROCEDURE — 76536 US EXAM OF HEAD AND NECK: CPT

## 2021-05-27 PROCEDURE — 76705 ECHO EXAM OF ABDOMEN: CPT

## 2021-05-28 NOTE — RESULT ENCOUNTER NOTE
Please call patient and notify that results are normal   Liver is mildly enlarged with normal contour and no evidence of masses  Two very small gallbladder polyps that do not need any further follow-up    No explanation for the transient elevation of her liver enzymes

## 2021-06-02 LAB — HBB GENE MUT ANL BLD/T: NORMAL

## 2021-06-03 LAB — MISCELLANEOUS LAB TEST RESULT: NORMAL

## 2021-06-07 NOTE — PROGRESS NOTES
Assessment/Plan   Diagnoses and all orders for this visit:    Routine gynecological examination  -     Liquid-based pap, screening    Encounter for screening mammogram for malignant neoplasm of breast  -     Mammo screening bilateral w 3d & cad; Future    Irregular bleeding  -     US pelvis complete w transvaginal; Future        Discussion    All questions have been answered to her satisfaction  RTO for APE or sooner if needed      Subjective     HPI   Marvetta Goodpasture is a 37 y o  female who presents for annual well woman exam    LMP - 21  Periods are more irregular come q 14-24 days  Will last 3-4 days  Periods usually heavy first few days with a lot of cramping  Changing pad q 2 hours for first few days  Does not use any OTC meds at all  Pt not as worried as treatment, as she deals w her sx, but wants to make sure every thing is ok  No vulvar itch/burn; No vaginal itch/burn; No abn discharge or odor; No urinary sx - burning/pain/frequency/hematuria    (+) SBEs - no breast masses, asymmetry, nipple discharge or bleeding, changes in skin of breast, or breast tenderness bilaterally    No abd/pelvic pain or HAs; No menopausal symptoms: No hot flashes/night sweats, problems with intercourse, vaginal dryness; sleeping well  Pt is not often sexually active but  in a mutually monog/ sexual relationship; She declines std/hiv/hep testing; Feels safe at home      (+) PCP for routine Bw/care; Last Pap - 2/13/15WNL neg HPV  History of abnormal Pap smear: denies   Last mammo - NA      Review of Systems   Constitutional: Negative  Respiratory: Negative  Gastrointestinal: Negative  Endocrine: Negative  Genitourinary: Negative          The following portions of the patient's history were reviewed and updated as appropriate: allergies, current medications, past family history, past medical history, past social history, past surgical history and problem list          OB History        2 Para   2    Term   2            AB        Living   2       SAB        TAB        Ectopic        Multiple        Live Births                     Past Medical History:   Diagnosis Date    Viral gastroenteritis     [id number] RESOLVED       Past Surgical History:   Procedure Laterality Date    TOOTH EXTRACTION         Family History   Problem Relation Age of Onset    No Known Problems Mother     No Known Problems Father        Social History     Socioeconomic History    Marital status: /Civil Union     Spouse name: Not on file    Number of children: Not on file    Years of education: Not on file    Highest education level: Not on file   Occupational History    Not on file   Social Needs    Financial resource strain: Not on file    Food insecurity     Worry: Not on file     Inability: Not on file    Transportation needs     Medical: Not on file     Non-medical: Not on file   Tobacco Use    Smoking status: Current Every Day Smoker     Packs/day: 1 00     Years: 20 00     Pack years: 20 00     Types: Cigarettes    Smokeless tobacco: Never Used   Substance and Sexual Activity    Alcohol use: Yes     Comment: socially     Drug use: No    Sexual activity: Not Currently   Lifestyle    Physical activity     Days per week: Not on file     Minutes per session: Not on file    Stress: Not on file   Relationships    Social connections     Talks on phone: Not on file     Gets together: Not on file     Attends Buddhism service: Not on file     Active member of club or organization: Not on file     Attends meetings of clubs or organizations: Not on file     Relationship status: Not on file    Intimate partner violence     Fear of current or ex partner: Not on file     Emotionally abused: Not on file     Physically abused: Not on file     Forced sexual activity: Not on file   Other Topics Concern    Not on file   Social History Narrative    Not on file       No current outpatient medications on file     Allergies   Allergen Reactions    Pollen Extract        Objective   Vitals:    06/08/21 1059   BP: 114/62   BP Location: Left arm   Patient Position: Sitting   Cuff Size: Standard   Weight: 64 kg (141 lb)   Height: 5' 6" (1 676 m)     Physical Exam  Constitutional:       Appearance: She is well-developed  HENT:      Head: Normocephalic and atraumatic  Cardiovascular:      Rate and Rhythm: Normal rate and regular rhythm  Pulmonary:      Effort: Pulmonary effort is normal       Breath sounds: Normal breath sounds  Chest:      Breasts: Breasts are symmetrical          Right: No inverted nipple, mass, nipple discharge, skin change or tenderness  Left: No inverted nipple, mass, nipple discharge, skin change or tenderness  Abdominal:      General: There is no distension  Palpations: Abdomen is soft  There is no mass  Tenderness: There is no guarding or rebound  Genitourinary:     Exam position: Supine  Labia:         Right: No rash  Left: No rash  Vagina: No signs of injury and foreign body  No vaginal discharge or erythema  Cervix: No cervical motion tenderness  Adnexa:         Right: No mass  Left: No mass  Rectum: Guaiac result negative  Skin:     General: Skin is warm and dry  Neurological:      Mental Status: She is alert and oriented to person, place, and time  Patient Instructions   Will plan pelvic US to evaluate irregular bleeding  Aware may plan further endo biopsy pending US results  Will f/u after pelvic US done and plan as needed  Pap done today  Mammo ordered  Pt had recent TFTs w PCP that were normal  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

## 2021-06-08 ENCOUNTER — OFFICE VISIT (OUTPATIENT)
Dept: OBGYN CLINIC | Facility: CLINIC | Age: 41
End: 2021-06-08
Payer: COMMERCIAL

## 2021-06-08 VITALS
HEIGHT: 66 IN | WEIGHT: 141 LBS | BODY MASS INDEX: 22.66 KG/M2 | SYSTOLIC BLOOD PRESSURE: 114 MMHG | DIASTOLIC BLOOD PRESSURE: 62 MMHG

## 2021-06-08 DIAGNOSIS — Z12.31 ENCOUNTER FOR SCREENING MAMMOGRAM FOR MALIGNANT NEOPLASM OF BREAST: ICD-10-CM

## 2021-06-08 DIAGNOSIS — Z01.419 ROUTINE GYNECOLOGICAL EXAMINATION: Primary | ICD-10-CM

## 2021-06-08 DIAGNOSIS — N92.6 IRREGULAR BLEEDING: ICD-10-CM

## 2021-06-08 PROCEDURE — G0145 SCR C/V CYTO,THINLAYER,RESCR: HCPCS | Performed by: PHYSICIAN ASSISTANT

## 2021-06-08 PROCEDURE — 87624 HPV HI-RISK TYP POOLED RSLT: CPT | Performed by: PHYSICIAN ASSISTANT

## 2021-06-08 PROCEDURE — 99386 PREV VISIT NEW AGE 40-64: CPT | Performed by: PHYSICIAN ASSISTANT

## 2021-06-08 NOTE — PATIENT INSTRUCTIONS
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

## 2021-06-09 LAB
HPV HR 12 DNA CVX QL NAA+PROBE: NEGATIVE
HPV16 DNA CVX QL NAA+PROBE: NEGATIVE
HPV18 DNA CVX QL NAA+PROBE: NEGATIVE

## 2021-06-10 LAB
LAB AP GYN PRIMARY INTERPRETATION: NORMAL
Lab: NORMAL

## 2021-06-30 ENCOUNTER — HOSPITAL ENCOUNTER (OUTPATIENT)
Dept: ULTRASOUND IMAGING | Facility: HOSPITAL | Age: 41
Discharge: HOME | End: 2021-06-30
Payer: COMMERCIAL

## 2021-06-30 ENCOUNTER — HOSPITAL ENCOUNTER (OUTPATIENT)
Dept: RADIOLOGY | Facility: IMAGING CENTER | Age: 41
Discharge: HOME | End: 2021-06-30
Payer: COMMERCIAL

## 2021-06-30 DIAGNOSIS — N92.6 IRREGULAR BLEEDING: ICD-10-CM

## 2021-06-30 PROCEDURE — 76830 TRANSVAGINAL US NON-OB: CPT

## 2021-06-30 PROCEDURE — 76856 US EXAM PELVIC COMPLETE: CPT

## 2021-07-13 ENCOUNTER — TELEPHONE (OUTPATIENT)
Dept: OBGYN CLINIC | Facility: CLINIC | Age: 41
End: 2021-07-13

## 2021-07-13 NOTE — TELEPHONE ENCOUNTER
Spoke w pt  Reviewed pelvic US results  Denies any pelvic pain or discomfort not associated with her menses  We did review finding of ovarian cysts  As pt is asx, will watch for now  If develops any pain will consider repeating pelvic US after a few cycles  We did review endo biopsy as an option given frequent menses but pt desires to hold off on that for now  Declines any hormonal tx options at this point either  If sx worsen at all, will consider endo biopsy as next step in work up

## 2021-09-16 ENCOUNTER — HOSPITAL ENCOUNTER (OUTPATIENT)
Dept: RADIOLOGY | Facility: IMAGING CENTER | Age: 41
Discharge: HOME | End: 2021-09-16
Payer: COMMERCIAL

## 2021-09-16 VITALS — WEIGHT: 135 LBS | BODY MASS INDEX: 21.69 KG/M2 | HEIGHT: 66 IN

## 2021-09-16 DIAGNOSIS — Z12.31 ENCOUNTER FOR SCREENING MAMMOGRAM FOR MALIGNANT NEOPLASM OF BREAST: ICD-10-CM

## 2021-09-16 PROCEDURE — 77063 BREAST TOMOSYNTHESIS BI: CPT

## 2021-09-16 PROCEDURE — 77067 SCR MAMMO BI INCL CAD: CPT

## 2021-09-17 ENCOUNTER — TELEPHONE (OUTPATIENT)
Dept: FAMILY MEDICINE CLINIC | Facility: CLINIC | Age: 41
End: 2021-09-17

## 2021-09-17 NOTE — TELEPHONE ENCOUNTER
----- Message from Devika Keen DO sent at 9/16/2021  4:52 PM EDT -----  Normal screening mammogram   Repeat study in 1 year

## 2021-11-12 ENCOUNTER — IMMUNIZATIONS (OUTPATIENT)
Dept: FAMILY MEDICINE CLINIC | Facility: CLINIC | Age: 41
End: 2021-11-12
Payer: COMMERCIAL

## 2021-11-12 DIAGNOSIS — Z23 ENCOUNTER FOR IMMUNIZATION: Primary | ICD-10-CM

## 2021-11-12 PROCEDURE — 90686 IIV4 VACC NO PRSV 0.5 ML IM: CPT

## 2021-11-12 PROCEDURE — 90471 IMMUNIZATION ADMIN: CPT

## 2021-12-15 ENCOUNTER — IMMUNIZATIONS (OUTPATIENT)
Dept: FAMILY MEDICINE CLINIC | Facility: HOSPITAL | Age: 41
End: 2021-12-15

## 2021-12-15 DIAGNOSIS — Z23 ENCOUNTER FOR IMMUNIZATION: Primary | ICD-10-CM

## 2021-12-15 PROCEDURE — 91300 COVID-19 PFIZER VACC 0.3 ML: CPT

## 2021-12-15 PROCEDURE — 0001A COVID-19 PFIZER VACC 0.3 ML: CPT

## 2022-04-08 ENCOUNTER — OFFICE VISIT (OUTPATIENT)
Dept: URGENT CARE | Facility: MEDICAL CENTER | Age: 42
End: 2022-04-08
Payer: COMMERCIAL

## 2022-04-08 VITALS
OXYGEN SATURATION: 98 % | DIASTOLIC BLOOD PRESSURE: 65 MMHG | SYSTOLIC BLOOD PRESSURE: 101 MMHG | WEIGHT: 135 LBS | TEMPERATURE: 98.4 F | RESPIRATION RATE: 18 BRPM | BODY MASS INDEX: 21.79 KG/M2 | HEART RATE: 85 BPM

## 2022-04-08 DIAGNOSIS — R07.9 CHEST PAIN, UNSPECIFIED TYPE: Primary | ICD-10-CM

## 2022-04-08 DIAGNOSIS — J30.9 ALLERGIC RHINITIS, UNSPECIFIED SEASONALITY, UNSPECIFIED TRIGGER: ICD-10-CM

## 2022-04-08 LAB
ATRIAL RATE: 66 BPM
P AXIS: 56 DEGREES
PR INTERVAL: 106 MS
QRS AXIS: 85 DEGREES
QRSD INTERVAL: 84 MS
QT INTERVAL: 396 MS
QTC INTERVAL: 415 MS
T WAVE AXIS: 76 DEGREES
VENTRICULAR RATE: 66 BPM

## 2022-04-08 PROCEDURE — S9083 URGENT CARE CENTER GLOBAL: HCPCS | Performed by: PHYSICIAN ASSISTANT

## 2022-04-08 PROCEDURE — G0382 LEV 3 HOSP TYPE B ED VISIT: HCPCS | Performed by: PHYSICIAN ASSISTANT

## 2022-04-08 PROCEDURE — 93010 ELECTROCARDIOGRAM REPORT: CPT | Performed by: PHYSICIAN ASSISTANT

## 2022-04-08 PROCEDURE — 93005 ELECTROCARDIOGRAM TRACING: CPT | Performed by: PHYSICIAN ASSISTANT

## 2022-04-08 RX ORDER — FLUTICASONE PROPIONATE 50 MCG
2 SPRAY, SUSPENSION (ML) NASAL DAILY
Qty: 16 G | Refills: 0 | Status: SHIPPED | OUTPATIENT
Start: 2022-04-08

## 2022-04-08 NOTE — PROGRESS NOTES
[facility]        NAME: Addie Dow is a 37 y o  female  : 1980    MRN: [id number]  DATE: 2022  TIME: 12:03 PM    Assessment and Plan   Chest pain, unspecified type [R07 9]  1  Chest pain, unspecified type     2  Allergic rhinitis, unspecified seasonality, unspecified trigger  fluticasone (FLONASE) 50 mcg/act nasal spray         Patient Instructions       Follow up with PCP in 3-5 days  I explained to the patient that there is no sign of acute changes in her EKG at the present time and that her chest pain is mainly lying down and is intermittent and for long period of time requires further workup  I explained to her that if her pain changes or increases prior to seeing her PCP she should go immediately to the ER  I explained to her that at the present time seems her allergies are increasing and we will have her start Allegra and add some Flonase  Chief Complaint     Chief Complaint   Patient presents with    Chest Pain     Patient c/o L side chest pain with pressure that is been intermittent for the last 3 months         History of Present Illness       Patient with 4 month history of left-sided chest discomfort that mainly occurs when she lies down at night  Has been getting worse over the last few days  She feels things dripping down the back of her throat and has more phlegm in his coughing  She does have history of seasonal allergies  She is 1 pack per day history smoker but is trying to quit down to about 5 cigarettes a day  Her seasonal allergy usually occurred this time the year and she intermittently takes something  Chest Pain   This is a new problem  The current episode started more than 1 month ago  The onset quality is gradual  The problem occurs intermittently  The pain is mild  The quality of the pain is described as pressure  The pain radiates to the left shoulder   Pertinent negatives include no abdominal pain, back pain, claudication, cough, diaphoresis, dizziness, exertional chest pressure, fever, headaches, hemoptysis, irregular heartbeat, leg pain, lower extremity edema, malaise/fatigue, nausea, near-syncope, numbness, orthopnea, palpitations, PND, shortness of breath, sputum production, syncope, vomiting or weakness  She has tried nothing for the symptoms  Risk factors include smoking/tobacco exposure and lack of exercise  Review of Systems   Review of Systems   Constitutional: Negative for diaphoresis, fever and malaise/fatigue  Respiratory: Negative for cough, hemoptysis, sputum production and shortness of breath  Cardiovascular: Positive for chest pain  Negative for palpitations, orthopnea, claudication, syncope, PND and near-syncope  Gastrointestinal: Negative for abdominal pain, nausea and vomiting  Musculoskeletal: Negative for back pain  Neurological: Negative for dizziness, weakness, numbness and headaches  All other systems reviewed and are negative          Current Medications       Current Outpatient Medications:     fluticasone (FLONASE) 50 mcg/act nasal spray, 2 sprays into each nostril daily, Disp: 16 g, Rfl: 0    Current Allergies     Allergies as of 04/08/2022 - Reviewed 09/16/2021   Allergen Reaction Noted    Pollen extract  05/12/2014            The following portions of the patient's history were reviewed and updated as appropriate: allergies, current medications, past family history, past medical history, past social history, past surgical history and problem list      Past Medical History:   Diagnosis Date    Viral gastroenteritis     [id number] RESOLVED       Past Surgical History:   Procedure Laterality Date    TOOTH EXTRACTION         Family History   Problem Relation Age of Onset    No Known Problems Mother     Prostate cancer Father 71    No Known Problems Maternal Grandmother     No Known Problems Maternal Grandfather     No Known Problems Paternal Grandmother     No Known Problems Paternal Grandfather    Hugo Koo No Known Problems Brother     No Known Problems Son     No Known Problems Son     No Known Problems Maternal Aunt     No Known Problems Maternal Aunt          Medications have been verified  Objective   /65   Pulse 85   Temp 98 4 °F (36 9 °C)   Resp 18   Wt 61 2 kg (135 lb)   SpO2 98%   BMI 21 79 kg/m²   No LMP recorded  Physical Exam     Physical Exam  Vitals and nursing note reviewed  Constitutional:       Appearance: She is well-developed  She is obese  HENT:      Right Ear: Ear canal and external ear normal  Tympanic membrane is bulging  Left Ear: Ear canal and external ear normal  Tympanic membrane is bulging  Nose: Congestion and rhinorrhea present  Mouth/Throat:      Mouth: Mucous membranes are moist    Cardiovascular:      Rate and Rhythm: Normal rate  Heart sounds: Normal heart sounds  Pulmonary:      Effort: Pulmonary effort is normal       Breath sounds: Normal breath sounds  Chest:      Chest wall: No mass or tenderness  Skin:     Capillary Refill: Capillary refill takes less than 2 seconds  Neurological:      Mental Status: She is alert

## 2022-04-13 ENCOUNTER — OFFICE VISIT (OUTPATIENT)
Dept: FAMILY MEDICINE CLINIC | Facility: CLINIC | Age: 42
End: 2022-04-13
Payer: COMMERCIAL

## 2022-04-13 VITALS
HEART RATE: 90 BPM | WEIGHT: 143 LBS | TEMPERATURE: 98.1 F | BODY MASS INDEX: 22.98 KG/M2 | SYSTOLIC BLOOD PRESSURE: 122 MMHG | HEIGHT: 66 IN | RESPIRATION RATE: 16 BRPM | OXYGEN SATURATION: 98 % | DIASTOLIC BLOOD PRESSURE: 64 MMHG

## 2022-04-13 DIAGNOSIS — J20.9 ACUTE BRONCHITIS, UNSPECIFIED ORGANISM: Primary | ICD-10-CM

## 2022-04-13 PROBLEM — R07.81 PLEURITIC CHEST PAIN: Status: ACTIVE | Noted: 2022-04-13

## 2022-04-13 PROCEDURE — 99214 OFFICE O/P EST MOD 30 MIN: CPT | Performed by: FAMILY MEDICINE

## 2022-04-13 RX ORDER — AZITHROMYCIN 250 MG/1
TABLET, FILM COATED ORAL
Qty: 6 TABLET | Refills: 0 | Status: SHIPPED | OUTPATIENT
Start: 2022-04-13 | End: 2022-04-17

## 2022-04-13 RX ORDER — PREDNISONE 20 MG/1
40 TABLET ORAL DAILY
Qty: 10 TABLET | Refills: 0 | Status: SHIPPED | OUTPATIENT
Start: 2022-04-13 | End: 2022-04-18

## 2022-04-13 NOTE — ASSESSMENT & PLAN NOTE
- not environmental allergies  Patient is sick    -patient will be placed on course of Zithromax and pulse course of prednisone 40 mg daily x5 days    I also provided sample of Trelegy the Ellipta inhaler with instructions for use    -if patient is not feeling any better with treatment then I did recommend contact the office and I will place an order for a chest x-ray

## 2022-04-13 NOTE — PROGRESS NOTES
Subjective:      Patient ID: Bruno Wilkinson is a 37 y o  female  81-year-old female presents with several month history of chest pain with lying down  Patient was seen at [facility] on April 8th  EKG was unremarkable  Patient does smoke but has cut down to 6 cigarettes per day  Son was sick with similar symptoms last week  No fever  Pain is worse when coughing  Cough is moist    Shortness of Breath  This is a recurrent problem  The current episode started more than 1 month ago  The problem occurs daily  The problem has been rapidly worsening  The average episode lasts 30 seconds  Associated symptoms include abdominal pain, chest pain, coryza, ear pain, a fever, headaches, neck pain, orthopnea, rhinorrhea, a sore throat, sputum production, swollen glands and wheezing  Pertinent negatives include no claudication, hemoptysis, leg pain, leg swelling, PND, rash, syncope or vomiting  The symptoms are aggravated by smoke and URIs  Past Medical History:   Diagnosis Date    Viral gastroenteritis     [id number] RESOLVED       Family History   Problem Relation Age of Onset    No Known Problems Mother     Prostate cancer Father 71    No Known Problems Maternal Grandmother     No Known Problems Maternal Grandfather     No Known Problems Paternal Grandmother     No Known Problems Paternal Grandfather     No Known Problems Brother     No Known Problems Son     No Known Problems Son     No Known Problems Maternal Aunt     No Known Problems Maternal Aunt        Past Surgical History:   Procedure Laterality Date    TOOTH EXTRACTION          reports that she has been smoking cigarettes  She has a 20 00 pack-year smoking history  She has never used smokeless tobacco  She reports current alcohol use  She reports that she does not use drugs        Current Outpatient Medications:     fluticasone (FLONASE) 50 mcg/act nasal spray, 2 sprays into each nostril daily, Disp: 16 g, Rfl: 0    azithromycin (ZITHROMAX) 250 mg tablet, Take 2 tablets today then 1 tablet daily x 4 days, Disp: 6 tablet, Rfl: 0    predniSONE 20 mg tablet, Take 2 tablets (40 mg total) by mouth daily for 5 days, Disp: 10 tablet, Rfl: 0    The following portions of the patient's history were reviewed and updated as appropriate: allergies, current medications, past family history, past medical history, past social history, past surgical history and problem list     Review of Systems   Constitutional: Positive for fever  Negative for appetite change  HENT: Positive for ear pain, rhinorrhea and sore throat  Respiratory: Positive for sputum production, shortness of breath and wheezing  Negative for hemoptysis  Cardiovascular: Positive for chest pain and orthopnea  Negative for claudication, leg swelling, syncope and PND  Gastrointestinal: Positive for abdominal pain  Negative for vomiting  Musculoskeletal: Positive for neck pain  Skin: Negative for rash  Neurological: Positive for headaches  Objective:    /64   Pulse 90   Temp 98 1 °F (36 7 °C) (Tympanic)   Resp 16   Ht 5' 6" (1 676 m)   Wt 64 9 kg (143 lb)   SpO2 98%   BMI 23 08 kg/m²      Physical Exam  Vitals and nursing note reviewed  Constitutional:       General: She is not in acute distress  Appearance: She is well-developed  She is ill-appearing  HENT:      Head: Normocephalic and atraumatic  Right Ear: Hearing normal       Left Ear: Hearing normal       Nose: Nose normal  No nasal deformity, septal deviation, mucosal edema or congestion  Right Sinus: No maxillary sinus tenderness or frontal sinus tenderness  Left Sinus: No maxillary sinus tenderness or frontal sinus tenderness  Mouth/Throat:      Pharynx: No oropharyngeal exudate  Eyes:      Conjunctiva/sclera: Conjunctivae normal       Pupils: Pupils are equal, round, and reactive to light  Cardiovascular:      Rate and Rhythm: Normal rate and regular rhythm  Pulmonary:      Effort: Pulmonary effort is normal  No accessory muscle usage or respiratory distress  Breath sounds: Wheezing and rhonchi present  No rales  Musculoskeletal:      Cervical back: Normal range of motion and neck supple  Recent Results (from the past 1008 hour(s))   ECG 12 lead    Collection Time: 04/08/22 10:36 AM   Result Value Ref Range    Ventricular Rate 66 BPM    Atrial Rate 66 BPM    UT Interval 106 ms    QRSD Interval 84 ms    QT Interval 396 ms    QTC Interval 415 ms    P Axis 56 degrees    QRS Axis 85 degrees    T Wave Axis 76 degrees       Assessment/Plan:    Acute bronchitis  - not environmental allergies  Patient is sick    -patient will be placed on course of Zithromax and pulse course of prednisone 40 mg daily x5 days  I also provided sample of Trelegy the Ellipta inhaler with instructions for use    -if patient is not feeling any better with treatment then I did recommend contact the office and I will place an order for a chest x-ray    Pleuritic chest pain  -pleuritic chest pain related to acute bronchitis and coughing  Reassured patient that her symptoms do not sound cardiac in nature  She had a normal EKG    -hopefully prednisone will ease the inflammation that she is experiencing  If no improvement contact the office and I will place order for chest x-ray          Problem List Items Addressed This Visit        Respiratory    Acute bronchitis - Primary     - not environmental allergies  Patient is sick    -patient will be placed on course of Zithromax and pulse course of prednisone 40 mg daily x5 days    I also provided sample of Trelegy the Ellipta inhaler with instructions for use    -if patient is not feeling any better with treatment then I did recommend contact the office and I will place an order for a chest x-ray         Relevant Medications    azithromycin (ZITHROMAX) 250 mg tablet    predniSONE 20 mg tablet

## 2022-04-13 NOTE — ASSESSMENT & PLAN NOTE
-pleuritic chest pain related to acute bronchitis and coughing  Reassured patient that her symptoms do not sound cardiac in nature  She had a normal EKG    -hopefully prednisone will ease the inflammation that she is experiencing    If no improvement contact the office and I will place order for chest x-ray

## 2022-07-26 ENCOUNTER — OFFICE VISIT (OUTPATIENT)
Dept: FAMILY MEDICINE CLINIC | Facility: CLINIC | Age: 42
End: 2022-07-26
Payer: COMMERCIAL

## 2022-07-26 VITALS
HEIGHT: 66 IN | DIASTOLIC BLOOD PRESSURE: 60 MMHG | HEART RATE: 81 BPM | OXYGEN SATURATION: 99 % | SYSTOLIC BLOOD PRESSURE: 100 MMHG | WEIGHT: 142 LBS | BODY MASS INDEX: 22.82 KG/M2

## 2022-07-26 DIAGNOSIS — E78.2 MIXED HYPERLIPIDEMIA: ICD-10-CM

## 2022-07-26 DIAGNOSIS — Z11.59 NEED FOR HEPATITIS C SCREENING TEST: ICD-10-CM

## 2022-07-26 DIAGNOSIS — Z13.1 DIABETES MELLITUS SCREENING: ICD-10-CM

## 2022-07-26 DIAGNOSIS — Z00.00 PHYSICAL EXAM, ANNUAL: Primary | ICD-10-CM

## 2022-07-26 DIAGNOSIS — D56.3 BETA THALASSEMIA MINOR: ICD-10-CM

## 2022-07-26 DIAGNOSIS — E04.1 THYROID NODULE: ICD-10-CM

## 2022-07-26 DIAGNOSIS — Z12.31 ENCOUNTER FOR SCREENING MAMMOGRAM FOR BREAST CANCER: ICD-10-CM

## 2022-07-26 PROCEDURE — 99396 PREV VISIT EST AGE 40-64: CPT | Performed by: FAMILY MEDICINE

## 2022-07-26 NOTE — ASSESSMENT & PLAN NOTE
Patient has longstanding history of beta thalassemia minor and hypochromic, microcytic anemia    Check CBC

## 2022-07-26 NOTE — ASSESSMENT & PLAN NOTE
Thyroid ultrasound performed in 2021 showed Small subcentimeter oval circumscribed left lower pole nodule, Ti-RAD 4  Suggest follow-up surveillance at 1, 2, 3, and 5 years      Repeat thyroid ultrasound ordered

## 2022-07-26 NOTE — PROGRESS NOTES
Subjective:      Patient ID: Yulissa Zavala is a 40 y o  female  Generally healthy 50-year-old female presents for annual physical examination  No particular complaints or concerns  She is just getting her physical exam in for health screening purposes for 's health insurance  She is a smoker  Knows that she should quit  She does have history thyroid nodules  Last thyroid ultrasound was performed last May  Due for repeat thyroid ultrasound  Past Medical History:   Diagnosis Date    Viral gastroenteritis     [id number] RESOLVED       Family History   Problem Relation Age of Onset    No Known Problems Mother     Prostate cancer Father 71    No Known Problems Maternal Grandmother     No Known Problems Maternal Grandfather     No Known Problems Paternal Grandmother     No Known Problems Paternal Grandfather     No Known Problems Brother     No Known Problems Son     No Known Problems Son     No Known Problems Maternal Aunt     No Known Problems Maternal Aunt        Past Surgical History:   Procedure Laterality Date    TOOTH EXTRACTION          reports that she has been smoking cigarettes  She has a 20 00 pack-year smoking history  She has never used smokeless tobacco  She reports current alcohol use  She reports that she does not use drugs  Current Outpatient Medications:     fluticasone (FLONASE) 50 mcg/act nasal spray, 2 sprays into each nostril daily, Disp: 16 g, Rfl: 0    The following portions of the patient's history were reviewed and updated as appropriate: allergies, current medications, past family history, past medical history, past social history, past surgical history and problem list     Review of Systems   Constitutional: Negative  HENT: Negative  Eyes: Negative  Respiratory: Negative  Cardiovascular: Negative  Gastrointestinal: Negative  Endocrine: Negative  Genitourinary: Negative  Musculoskeletal: Negative  Skin: Negative  Allergic/Immunologic: Negative  Neurological: Negative  Hematological: Negative  Psychiatric/Behavioral: Negative  Objective:    /60   Pulse 81   Ht 5' 6" (1 676 m)   Wt 64 4 kg (142 lb)   SpO2 99%   BMI 22 92 kg/m²      Physical Exam  Vitals and nursing note reviewed  Constitutional:       General: She is not in acute distress  Appearance: Normal appearance  She is well-developed and normal weight  She is not diaphoretic  HENT:      Head: Normocephalic and atraumatic  Right Ear: External ear normal       Left Ear: External ear normal       Nose: Nose normal    Eyes:      Conjunctiva/sclera: Conjunctivae normal       Pupils: Pupils are equal, round, and reactive to light  Neck:      Comments: Small thyroid nodule at the left lower pole of the thyroid  Cardiovascular:      Rate and Rhythm: Normal rate and regular rhythm  Heart sounds: Normal heart sounds  No murmur heard  Pulmonary:      Effort: Pulmonary effort is normal       Breath sounds: Normal breath sounds  Abdominal:      General: Bowel sounds are normal       Palpations: Abdomen is soft  Musculoskeletal:         General: Normal range of motion  Cervical back: Normal range of motion and neck supple  Skin:     General: Skin is warm and dry  Findings: No rash  Neurological:      General: No focal deficit present  Mental Status: She is alert and oriented to person, place, and time  Mental status is at baseline  Deep Tendon Reflexes: Reflexes are normal and symmetric  Psychiatric:         Mood and Affect: Mood normal          Behavior: Behavior normal          Thought Content: Thought content normal          Judgment: Judgment normal            No results found for this or any previous visit (from the past 1008 hour(s))  Assessment/Plan:    Thyroid nodule  Thyroid ultrasound performed in 2021 showed Small subcentimeter oval circumscribed left lower pole nodule, Ti-RAD 4  Suggest follow-up surveillance at 1, 2, 3, and 5 years  Repeat thyroid ultrasound ordered    Physical exam, annual  Annual physical examination completed    -patient given order for screening blood work        Need for hepatitis C screening test  Hep C screening test ordered    Mixed hyperlipidemia  Fasting lipid profile ordered    Microcytic hypochromic anemia  Patient has longstanding history of beta thalassemia minor and hypochromic, microcytic anemia  Check CBC    Diabetes mellitus screening  Screening hemoglobin A1c ordered    Beta thalassemia minor  History of beta thalassemia minor  Check CBC              Problem List Items Addressed This Visit        Endocrine    Thyroid nodule     Thyroid ultrasound performed in 2021 showed Small subcentimeter oval circumscribed left lower pole nodule, Ti-RAD 4  Suggest follow-up surveillance at 1, 2, 3, and 5 years  Repeat thyroid ultrasound ordered         Relevant Orders    TSH, 3rd generation with Free T4 reflex    US thyroid       Other    Beta thalassemia minor     History of beta thalassemia minor    Check CBC         Relevant Orders    CBC and differential    Diabetes mellitus screening     Screening hemoglobin A1c ordered         Relevant Orders    Hemoglobin A1C    Mixed hyperlipidemia     Fasting lipid profile ordered         Relevant Orders    Comprehensive metabolic panel    Lipid panel    Need for hepatitis C screening test     Hep C screening test ordered         Physical exam, annual - Primary     Annual physical examination completed    -patient given order for screening blood work             Relevant Orders    CBC and differential      Other Visit Diagnoses     Encounter for screening mammogram for breast cancer        Relevant Orders    Mammo screening bilateral w cad

## 2022-07-28 ENCOUNTER — HOSPITAL ENCOUNTER (OUTPATIENT)
Dept: RADIOLOGY | Facility: IMAGING CENTER | Age: 42
Discharge: HOME | End: 2022-07-28
Payer: COMMERCIAL

## 2022-07-28 ENCOUNTER — APPOINTMENT (OUTPATIENT)
Dept: LAB | Facility: IMAGING CENTER | Age: 42
End: 2022-07-28
Payer: COMMERCIAL

## 2022-07-28 DIAGNOSIS — Z00.00 PHYSICAL EXAM, ANNUAL: ICD-10-CM

## 2022-07-28 DIAGNOSIS — D56.3 BETA THALASSEMIA MINOR: ICD-10-CM

## 2022-07-28 DIAGNOSIS — E04.1 THYROID NODULE: ICD-10-CM

## 2022-07-28 DIAGNOSIS — E78.2 MIXED HYPERLIPIDEMIA: ICD-10-CM

## 2022-07-28 DIAGNOSIS — Z13.1 DIABETES MELLITUS SCREENING: ICD-10-CM

## 2022-07-28 LAB
ALBUMIN SERPL BCP-MCNC: 4.3 G/DL (ref 3.5–5)
ALP SERPL-CCNC: 43 U/L (ref 46–116)
ALT SERPL W P-5'-P-CCNC: 15 U/L (ref 12–78)
ANION GAP SERPL CALCULATED.3IONS-SCNC: 4 MMOL/L (ref 4–13)
AST SERPL W P-5'-P-CCNC: 15 U/L (ref 5–45)
BASOPHILS # BLD AUTO: 0.08 THOUSANDS/ΜL (ref 0–0.1)
BASOPHILS NFR BLD AUTO: 1 % (ref 0–1)
BILIRUB SERPL-MCNC: 0.91 MG/DL (ref 0.2–1)
BUN SERPL-MCNC: 9 MG/DL (ref 5–25)
CALCIUM SERPL-MCNC: 9.4 MG/DL (ref 8.3–10.1)
CHLORIDE SERPL-SCNC: 109 MMOL/L (ref 96–108)
CHOLEST SERPL-MCNC: 241 MG/DL
CO2 SERPL-SCNC: 25 MMOL/L (ref 21–32)
CREAT SERPL-MCNC: 0.94 MG/DL (ref 0.6–1.3)
EOSINOPHIL # BLD AUTO: 0.16 THOUSAND/ΜL (ref 0–0.61)
EOSINOPHIL NFR BLD AUTO: 2 % (ref 0–6)
ERYTHROCYTE [DISTWIDTH] IN BLOOD BY AUTOMATED COUNT: 18.1 % (ref 11.6–15.1)
EST. AVERAGE GLUCOSE BLD GHB EST-MCNC: 85 MG/DL
GFR SERPL CREATININE-BSD FRML MDRD: 75 ML/MIN/1.73SQ M
GLUCOSE P FAST SERPL-MCNC: 88 MG/DL (ref 65–99)
HBA1C MFR BLD: 4.6 %
HCT VFR BLD AUTO: 40.3 % (ref 34.8–46.1)
HDLC SERPL-MCNC: 41 MG/DL
HGB BLD-MCNC: 12.2 G/DL (ref 11.5–15.4)
IMM GRANULOCYTES # BLD AUTO: 0.07 THOUSAND/UL (ref 0–0.2)
IMM GRANULOCYTES NFR BLD AUTO: 1 % (ref 0–2)
LDLC SERPL CALC-MCNC: 169 MG/DL (ref 0–100)
LYMPHOCYTES # BLD AUTO: 2.39 THOUSANDS/ΜL (ref 0.6–4.47)
LYMPHOCYTES NFR BLD AUTO: 26 % (ref 14–44)
MCH RBC QN AUTO: 20.5 PG (ref 26.8–34.3)
MCHC RBC AUTO-ENTMCNC: 30.3 G/DL (ref 31.4–37.4)
MCV RBC AUTO: 68 FL (ref 82–98)
MONOCYTES # BLD AUTO: 0.91 THOUSAND/ΜL (ref 0.17–1.22)
MONOCYTES NFR BLD AUTO: 10 % (ref 4–12)
NEUTROPHILS # BLD AUTO: 5.64 THOUSANDS/ΜL (ref 1.85–7.62)
NEUTS SEG NFR BLD AUTO: 60 % (ref 43–75)
NONHDLC SERPL-MCNC: 200 MG/DL
NRBC BLD AUTO-RTO: 0 /100 WBCS
PLATELET # BLD AUTO: 323 THOUSANDS/UL (ref 149–390)
PMV BLD AUTO: 10.7 FL (ref 8.9–12.7)
POTASSIUM SERPL-SCNC: 3.9 MMOL/L (ref 3.5–5.3)
PROT SERPL-MCNC: 7.4 G/DL (ref 6.4–8.4)
RBC # BLD AUTO: 5.95 MILLION/UL (ref 3.81–5.12)
SODIUM SERPL-SCNC: 138 MMOL/L (ref 135–147)
TRIGL SERPL-MCNC: 154 MG/DL
TSH SERPL DL<=0.05 MIU/L-ACNC: 0.72 UIU/ML (ref 0.45–4.5)
WBC # BLD AUTO: 9.25 THOUSAND/UL (ref 4.31–10.16)

## 2022-07-28 PROCEDURE — 80053 COMPREHEN METABOLIC PANEL: CPT

## 2022-07-28 PROCEDURE — 76536 US EXAM OF HEAD AND NECK: CPT

## 2022-07-28 PROCEDURE — 85025 COMPLETE CBC W/AUTO DIFF WBC: CPT

## 2022-07-28 PROCEDURE — 84443 ASSAY THYROID STIM HORMONE: CPT

## 2022-07-28 PROCEDURE — 36415 COLL VENOUS BLD VENIPUNCTURE: CPT

## 2022-07-28 PROCEDURE — 83036 HEMOGLOBIN GLYCOSYLATED A1C: CPT

## 2022-07-28 PROCEDURE — 80061 LIPID PANEL: CPT

## 2022-08-01 ENCOUNTER — TELEPHONE (OUTPATIENT)
Dept: FAMILY MEDICINE CLINIC | Facility: CLINIC | Age: 42
End: 2022-08-01

## 2022-08-01 NOTE — TELEPHONE ENCOUNTER
----- Message from Idalia Brennan DO sent at 8/1/2022  8:03 AM EDT -----  Please call patient to schedule follow-up appointment to review lab results    Virtual visit to discuss cholesterol

## 2022-08-04 NOTE — RESULT ENCOUNTER NOTE
Please call patient and notify that results are normal   No significant thyroid nodules that will require biopsy  Left lower pole nodule increased by 1 mm over the course of a year    Nothing that would be worrisome

## 2022-08-30 ENCOUNTER — TELEMEDICINE (OUTPATIENT)
Dept: FAMILY MEDICINE CLINIC | Facility: CLINIC | Age: 42
End: 2022-08-30
Payer: COMMERCIAL

## 2022-08-30 VITALS — HEIGHT: 66 IN | WEIGHT: 142 LBS | BODY MASS INDEX: 22.82 KG/M2

## 2022-08-30 DIAGNOSIS — E78.2 MIXED HYPERLIPIDEMIA: Primary | ICD-10-CM

## 2022-08-30 PROBLEM — J20.9 ACUTE BRONCHITIS: Status: RESOLVED | Noted: 2022-04-13 | Resolved: 2022-08-30

## 2022-08-30 PROCEDURE — 99213 OFFICE O/P EST LOW 20 MIN: CPT | Performed by: FAMILY MEDICINE

## 2022-08-30 NOTE — PROGRESS NOTES
Virtual Regular Visit    Verification of patient location:    Patient is located in the following state in which I hold an active license [place]      Assessment/Plan:    Problem List Items Addressed This Visit        Other    Mixed hyperlipidemia - Primary     -fasting lipid profile does show elevation of cholesterol  Patient states that her diet is not great during the summer as kids are off from school and they frequently will eat out for meals    -patient would like to make dietary adjustments and will check her lipid panel in the winter  If her cholesterol is still elevated with dietary modification in the winter time then I would recommend going on rosuvastatin 5 mg once daily         Relevant Orders    Lipid panel            Tobacco Cessation Counseling: Tobacco cessation counseling was not provided  The patient is sincerely urged to quit consumption of tobacco  She is not ready to quit tobacco          Reason for visit is   Chief Complaint   Patient presents with    Follow-up    Virtual Regular Visit        Encounter provider Sandie Marcelo DO    Provider located at [address] [place] [id number]      Recent Visits  No visits were found meeting these conditions  Showing recent visits within past 7 days and meeting all other requirements  Today's Visits  Date Type Provider Dept   08/30/22 Telemedicine Sandie Marcelo DO [facility]   Showing today's visits and meeting all other requirements  Future Appointments  No visits were found meeting these conditions  Showing future appointments within next 150 days and meeting all other requirements       The patient was identified by name and date of birth  Caty Paniagua was informed that this is a telemedicine visit and that the visit is being conducted through [address] [place] [address] [facility] and patient was informed that this is a secure, HIPAA-compliant platform  She agrees to proceed     My office door was closed   No one else was in the room  She acknowledged consent and understanding of privacy and security of the video platform  The patient has agreed to participate and understands they can discontinue the visit at any time  Patient is aware this is a billable service  Subjective  Clay Driscoll is a 40 y o  female    28-year-old female presents via video virtual visit to review screening blood work that was done in July  Once again the patient's cholesterol is significantly elevated with a total cholesterol of 241 and LDL of 169  She has had elevated cholesterol readings in the past   Patient admittedly eats poorly especially during the summer time  We have never checked her lipid panel during the winter  No significant family history of hyperlipidemia or heart disease  Patient does have microcytosis but hemoglobin electrophoresis did show evidence of beta thalassemia minor       Past Medical History:   Diagnosis Date    Viral gastroenteritis     [id number] RESOLVED       Past Surgical History:   Procedure Laterality Date    TOOTH EXTRACTION         No current outpatient medications on file  No current facility-administered medications for this visit  Allergies   Allergen Reactions    Pollen Extract        Review of Systems   Constitutional: Negative  HENT: Negative  Eyes: Negative  Respiratory: Negative  Cardiovascular: Negative  Gastrointestinal: Negative  Endocrine: Negative  Genitourinary: Negative  Musculoskeletal: Negative  Skin: Negative  Allergic/Immunologic: Negative  Neurological: Negative  Hematological: Negative  Psychiatric/Behavioral: Negative  Video Exam    Vitals:    08/30/22 0939   Weight: 64 4 kg (142 lb)   Height: 5' 6" (1 676 m)       Physical Exam  Vitals and nursing note reviewed  Constitutional:       General: She is not in acute distress  Appearance: Normal appearance  She is well-developed and normal weight   She is not ill-appearing, toxic-appearing or diaphoretic  HENT:      Head: Normocephalic and atraumatic  Eyes:      Extraocular Movements: Extraocular movements intact  Conjunctiva/sclera: Conjunctivae normal       Pupils: Pupils are equal, round, and reactive to light  Pulmonary:      Effort: Pulmonary effort is normal  No respiratory distress  Neurological:      General: No focal deficit present  Mental Status: She is alert and oriented to person, place, and time  Psychiatric:         Mood and Affect: Mood normal          Behavior: Behavior normal          Thought Content:  Thought content normal          Judgment: Judgment normal           I spent 11 minutes directly with the patient during this visit    Recent Results (from the past 1344 hour(s))   CBC and differential    Collection Time: 07/28/22  9:50 AM   Result Value Ref Range    WBC 9 25 4 31 - 10 16 Thousand/uL    RBC 5 95 (H) 3 81 - 5 12 Million/uL    Hemoglobin 12 2 11 5 - 15 4 g/dL    Hematocrit 40 3 34 8 - 46 1 %    MCV 68 (L) 82 - 98 fL    MCH 20 5 (L) 26 8 - 34 3 pg    MCHC 30 3 (L) 31 4 - 37 4 g/dL    RDW 18 1 (H) 11 6 - 15 1 %    MPV 10 7 8 9 - 12 7 fL    Platelets 073 243 - 980 Thousands/uL    nRBC 0 /100 WBCs    Neutrophils Relative 60 43 - 75 %    Immat GRANS % 1 0 - 2 %    Lymphocytes Relative 26 14 - 44 %    Monocytes Relative 10 4 - 12 %    Eosinophils Relative 2 0 - 6 %    Basophils Relative 1 0 - 1 %    Neutrophils Absolute 5 64 1 85 - 7 62 Thousands/µL    Immature Grans Absolute 0 07 0 00 - 0 20 Thousand/uL    Lymphocytes Absolute 2 39 0 60 - 4 47 Thousands/µL    Monocytes Absolute 0 91 0 17 - 1 22 Thousand/µL    Eosinophils Absolute 0 16 0 00 - 0 61 Thousand/µL    Basophils Absolute 0 08 0 00 - 0 10 Thousands/µL   Comprehensive metabolic panel    Collection Time: 07/28/22  9:50 AM   Result Value Ref Range    Sodium 138 135 - 147 mmol/L    Potassium 3 9 3 5 - 5 3 mmol/L    Chloride 109 (H) 96 - 108 mmol/L    CO2 25 21 - 32 mmol/L ANION GAP 4 4 - 13 mmol/L    BUN 9 5 - 25 mg/dL    Creatinine 0 94 0 60 - 1 30 mg/dL    Glucose, Fasting 88 65 - 99 mg/dL    Calcium 9 4 8 3 - 10 1 mg/dL    AST 15 5 - 45 U/L    ALT 15 12 - 78 U/L    Alkaline Phosphatase 43 (L) 46 - 116 U/L    Total Protein 7 4 6 4 - 8 4 g/dL    Albumin 4 3 3 5 - 5 0 g/dL    Total Bilirubin 0 91 0 20 - 1 00 mg/dL    eGFR 75 ml/min/1 73sq m   Hemoglobin A1C    Collection Time: 07/28/22  9:50 AM   Result Value Ref Range    Hemoglobin A1C 4 6 Normal 3 8-5 6%; PreDiabetic 5 7-6 4%;  Diabetic >=6 5%; Glycemic control for adults with diabetes <7 0% %    EAG 85 mg/dl   Lipid panel    Collection Time: 07/28/22  9:50 AM   Result Value Ref Range    Cholesterol 241 (H) See Comment mg/dL    Triglycerides 154 (H) See Comment mg/dL    HDL, Direct 41 (L) >=50 mg/dL    LDL Calculated 169 (H) 0 - 100 mg/dL    Non-HDL-Chol (CHOL-HDL) 200 mg/dl   TSH, 3rd generation with Free T4 reflex    Collection Time: 07/28/22  9:50 AM   Result Value Ref Range    TSH 3RD GENERATON 0 725 0 450 - 4 500 uIU/mL

## 2022-10-11 PROBLEM — Z11.59 NEED FOR HEPATITIS C SCREENING TEST: Status: RESOLVED | Noted: 2022-07-26 | Resolved: 2022-10-11

## 2022-11-22 ENCOUNTER — IMMUNIZATIONS (OUTPATIENT)
Dept: FAMILY MEDICINE CLINIC | Facility: CLINIC | Age: 42
End: 2022-11-22

## 2022-11-22 DIAGNOSIS — Z23 ENCOUNTER FOR IMMUNIZATION: Primary | ICD-10-CM

## 2022-12-08 ENCOUNTER — TELEPHONE (OUTPATIENT)
Dept: FAMILY MEDICINE CLINIC | Facility: CLINIC | Age: 42
End: 2022-12-08

## 2022-12-08 DIAGNOSIS — Z12.31 ENCOUNTER FOR SCREENING MAMMOGRAM FOR BREAST CANCER: Primary | ICD-10-CM

## 2022-12-08 NOTE — TELEPHONE ENCOUNTER
Pt called and stated she needs a 3D Mammo referral  She said the one in chart was not the 3D  Please advise

## 2023-01-24 ENCOUNTER — HOSPITAL ENCOUNTER (OUTPATIENT)
Dept: RADIOLOGY | Facility: IMAGING CENTER | Age: 43
Discharge: HOME | End: 2023-01-24

## 2023-01-24 VITALS — BODY MASS INDEX: 22.82 KG/M2 | HEIGHT: 66 IN | WEIGHT: 141.98 LBS

## 2023-01-24 DIAGNOSIS — Z12.31 ENCOUNTER FOR SCREENING MAMMOGRAM FOR BREAST CANCER: ICD-10-CM

## 2023-01-30 ENCOUNTER — TELEPHONE (OUTPATIENT)
Dept: FAMILY MEDICINE CLINIC | Facility: CLINIC | Age: 43
End: 2023-01-30

## 2023-01-30 NOTE — TELEPHONE ENCOUNTER
----- Message from Yamila Mendez DO sent at 1/27/2023  4:56 PM EST -----  Normal screening mammogram   Repeat study in 1 year

## 2023-08-03 ENCOUNTER — APPOINTMENT (OUTPATIENT)
Dept: LAB | Facility: CLINIC | Age: 43
End: 2023-08-03
Payer: COMMERCIAL

## 2023-08-03 ENCOUNTER — OFFICE VISIT (OUTPATIENT)
Dept: FAMILY MEDICINE CLINIC | Facility: CLINIC | Age: 43
End: 2023-08-03
Payer: COMMERCIAL

## 2023-08-03 VITALS
OXYGEN SATURATION: 99 % | HEART RATE: 80 BPM | HEIGHT: 66 IN | RESPIRATION RATE: 16 BRPM | BODY MASS INDEX: 22.5 KG/M2 | SYSTOLIC BLOOD PRESSURE: 114 MMHG | WEIGHT: 140 LBS | DIASTOLIC BLOOD PRESSURE: 62 MMHG

## 2023-08-03 DIAGNOSIS — E78.2 MIXED HYPERLIPIDEMIA: ICD-10-CM

## 2023-08-03 DIAGNOSIS — Z13.1 DIABETES MELLITUS SCREENING: ICD-10-CM

## 2023-08-03 DIAGNOSIS — Z13.220 SCREENING FOR LIPID DISORDERS: ICD-10-CM

## 2023-08-03 DIAGNOSIS — Z00.00 PHYSICAL EXAM, ANNUAL: ICD-10-CM

## 2023-08-03 DIAGNOSIS — Z00.00 PHYSICAL EXAM, ANNUAL: Primary | ICD-10-CM

## 2023-08-03 DIAGNOSIS — R74.8 ELEVATED LIVER ENZYMES: ICD-10-CM

## 2023-08-03 PROBLEM — Q67.8 CHEST WALL ASYMMETRY: Status: RESOLVED | Noted: 2018-06-08 | Resolved: 2023-08-03

## 2023-08-03 PROBLEM — R07.81 PLEURITIC CHEST PAIN: Status: RESOLVED | Noted: 2022-04-13 | Resolved: 2023-08-03

## 2023-08-03 LAB
ALBUMIN SERPL BCP-MCNC: 4.3 G/DL (ref 3.5–5)
ALP SERPL-CCNC: 46 U/L (ref 46–116)
ALT SERPL W P-5'-P-CCNC: 16 U/L (ref 12–78)
ANION GAP SERPL CALCULATED.3IONS-SCNC: 3 MMOL/L
AST SERPL W P-5'-P-CCNC: 11 U/L (ref 5–45)
BASOPHILS # BLD AUTO: 0.1 THOUSANDS/ÂΜL (ref 0–0.1)
BASOPHILS NFR BLD AUTO: 1 % (ref 0–1)
BILIRUB SERPL-MCNC: 0.86 MG/DL (ref 0.2–1)
BUN SERPL-MCNC: 10 MG/DL (ref 5–25)
CALCIUM SERPL-MCNC: 9.3 MG/DL (ref 8.3–10.1)
CHLORIDE SERPL-SCNC: 108 MMOL/L (ref 96–108)
CHOLEST SERPL-MCNC: 246 MG/DL
CO2 SERPL-SCNC: 26 MMOL/L (ref 21–32)
CREAT SERPL-MCNC: 0.89 MG/DL (ref 0.6–1.3)
EOSINOPHIL # BLD AUTO: 0.15 THOUSAND/ÂΜL (ref 0–0.61)
EOSINOPHIL NFR BLD AUTO: 1 % (ref 0–6)
ERYTHROCYTE [DISTWIDTH] IN BLOOD BY AUTOMATED COUNT: 17.9 % (ref 11.6–15.1)
EST. AVERAGE GLUCOSE BLD GHB EST-MCNC: 97 MG/DL
GFR SERPL CREATININE-BSD FRML MDRD: 80 ML/MIN/1.73SQ M
GLUCOSE P FAST SERPL-MCNC: 94 MG/DL (ref 65–99)
HBA1C MFR BLD: 5 %
HCT VFR BLD AUTO: 40.4 % (ref 34.8–46.1)
HDLC SERPL-MCNC: 48 MG/DL
HGB BLD-MCNC: 12.4 G/DL (ref 11.5–15.4)
IMM GRANULOCYTES # BLD AUTO: 0.12 THOUSAND/UL (ref 0–0.2)
IMM GRANULOCYTES NFR BLD AUTO: 1 % (ref 0–2)
LDLC SERPL CALC-MCNC: 168 MG/DL (ref 0–100)
LYMPHOCYTES # BLD AUTO: 2.66 THOUSANDS/ÂΜL (ref 0.6–4.47)
LYMPHOCYTES NFR BLD AUTO: 22 % (ref 14–44)
MCH RBC QN AUTO: 20.3 PG (ref 26.8–34.3)
MCHC RBC AUTO-ENTMCNC: 30.7 G/DL (ref 31.4–37.4)
MCV RBC AUTO: 66 FL (ref 82–98)
MONOCYTES # BLD AUTO: 1.02 THOUSAND/ÂΜL (ref 0.17–1.22)
MONOCYTES NFR BLD AUTO: 9 % (ref 4–12)
NEUTROPHILS # BLD AUTO: 7.86 THOUSANDS/ÂΜL (ref 1.85–7.62)
NEUTS SEG NFR BLD AUTO: 66 % (ref 43–75)
NONHDLC SERPL-MCNC: 198 MG/DL
NRBC BLD AUTO-RTO: 0 /100 WBCS
PLATELET # BLD AUTO: 353 THOUSANDS/UL (ref 149–390)
PMV BLD AUTO: 10.3 FL (ref 8.9–12.7)
POTASSIUM SERPL-SCNC: 4.5 MMOL/L (ref 3.5–5.3)
PROT SERPL-MCNC: 7.7 G/DL (ref 6.4–8.4)
RBC # BLD AUTO: 6.1 MILLION/UL (ref 3.81–5.12)
SODIUM SERPL-SCNC: 137 MMOL/L (ref 135–147)
TRIGL SERPL-MCNC: 150 MG/DL
TSH SERPL DL<=0.05 MIU/L-ACNC: 1.05 UIU/ML (ref 0.45–4.5)
WBC # BLD AUTO: 11.91 THOUSAND/UL (ref 4.31–10.16)

## 2023-08-03 PROCEDURE — 80061 LIPID PANEL: CPT

## 2023-08-03 PROCEDURE — 84443 ASSAY THYROID STIM HORMONE: CPT

## 2023-08-03 PROCEDURE — 36415 COLL VENOUS BLD VENIPUNCTURE: CPT

## 2023-08-03 PROCEDURE — 83036 HEMOGLOBIN GLYCOSYLATED A1C: CPT

## 2023-08-03 PROCEDURE — 85025 COMPLETE CBC W/AUTO DIFF WBC: CPT

## 2023-08-03 PROCEDURE — 80053 COMPREHEN METABOLIC PANEL: CPT

## 2023-08-03 PROCEDURE — 99396 PREV VISIT EST AGE 40-64: CPT | Performed by: FAMILY MEDICINE

## 2023-08-03 NOTE — PROGRESS NOTES
Subjective:      Patient ID: Agata Pineda is a 44 y.o. female. 42-year-old female presents for annual physical examination. Patient does have history of borderline hyperlipidemia, beta thalassemia minor. Patient does smoke but is trying to quit once again. Needs to have screening blood work performed      Past Medical History:   Diagnosis Date   • Viral gastroenteritis     [id number] RESOLVED       Family History   Problem Relation Age of Onset   • No Known Problems Mother    • Prostate cancer Father 71   • No Known Problems Maternal Grandmother    • No Known Problems Maternal Grandfather    • No Known Problems Paternal Grandmother    • No Known Problems Paternal Grandfather    • No Known Problems Brother    • No Known Problems Son    • No Known Problems Son    • No Known Problems Maternal Aunt    • No Known Problems Maternal Aunt        Past Surgical History:   Procedure Laterality Date   • TOOTH EXTRACTION          reports that she has been smoking cigarettes. She has a 20.00 pack-year smoking history. She has never used smokeless tobacco. She reports current alcohol use. She reports that she does not use drugs. No current outpatient medications on file. The following portions of the patient's history were reviewed and updated as appropriate: allergies, current medications, past family history, past medical history, past social history, past surgical history and problem list.    Review of Systems   Constitutional: Negative. HENT: Negative. Eyes: Negative. Respiratory: Negative. Cardiovascular: Negative. Gastrointestinal: Negative. Endocrine: Negative. Genitourinary: Negative. Musculoskeletal: Negative. Skin: Negative. Allergic/Immunologic: Negative. Neurological: Negative. Hematological: Negative. Psychiatric/Behavioral: Negative.             Objective:    /62   Pulse 80   Resp 16   Ht 5' 6" (1.676 m)   Wt 63.5 kg (140 lb)   SpO2 99%   BMI 22.60 kg/m² Physical Exam  Vitals and nursing note reviewed. Constitutional:       General: She is not in acute distress. Appearance: Normal appearance. She is well-developed and normal weight. She is not diaphoretic. HENT:      Head: Normocephalic and atraumatic. Right Ear: Tympanic membrane, ear canal and external ear normal.      Left Ear: Tympanic membrane, ear canal and external ear normal.      Nose: Nose normal.   Eyes:      Extraocular Movements: Extraocular movements intact. Conjunctiva/sclera: Conjunctivae normal.      Pupils: Pupils are equal, round, and reactive to light. Cardiovascular:      Rate and Rhythm: Normal rate and regular rhythm. Pulses: Normal pulses. Heart sounds: Normal heart sounds. No murmur heard. Pulmonary:      Effort: Pulmonary effort is normal.      Breath sounds: Normal breath sounds. Abdominal:      General: Bowel sounds are normal.      Palpations: Abdomen is soft. Musculoskeletal:         General: Normal range of motion. Cervical back: Normal range of motion and neck supple. Skin:     General: Skin is warm and dry. Findings: No rash. Neurological:      General: No focal deficit present. Mental Status: She is alert and oriented to person, place, and time. Mental status is at baseline. Deep Tendon Reflexes: Reflexes are normal and symmetric. Psychiatric:         Mood and Affect: Mood normal.         Behavior: Behavior normal.         Thought Content: Thought content normal.         Judgment: Judgment normal.           No results found for this or any previous visit (from the past 1008 hour(s)). Assessment/Plan:    Screening for lipid disorders  Screening lipid profile ordered    Physical exam, annual  Annual physical examination performed    Mixed hyperlipidemia  Patient will again perform fasting lipid panel. She will have this done today. We will see how her cholesterol profile looks.   In the past there was suggestion of possibly going on statin therapy    Diabetes mellitus screening  Screening hemoglobin A1c ordered          Problem List Items Addressed This Visit        Other    Diabetes mellitus screening     Screening hemoglobin A1c ordered         Relevant Orders    Hemoglobin A1C    RESOLVED: Elevated liver enzymes    Mixed hyperlipidemia     Patient will again perform fasting lipid panel. She will have this done today. We will see how her cholesterol profile looks.   In the past there was suggestion of possibly going on statin therapy         Relevant Orders    Comprehensive metabolic panel    Lipid panel    Physical exam, annual - Primary     Annual physical examination performed         Relevant Orders    CBC and differential    TSH, 3rd generation with Free T4 reflex    Screening for lipid disorders     Screening lipid profile ordered

## 2023-08-03 NOTE — ASSESSMENT & PLAN NOTE
Patient will again perform fasting lipid panel. She will have this done today. We will see how her cholesterol profile looks.   In the past there was suggestion of possibly going on statin therapy

## 2023-10-23 ENCOUNTER — NURSE TRIAGE (OUTPATIENT)
Age: 43
End: 2023-10-23

## 2023-10-23 NOTE — TELEPHONE ENCOUNTER
----- Message from Nicole Plummer [place] sent at 10/23/2023 10:23 AM EDT -----  Patient scheduled for this evening, please triage symptoms to ensure nothing critical

## 2023-11-08 ENCOUNTER — IMMUNIZATIONS (OUTPATIENT)
Dept: FAMILY MEDICINE CLINIC | Facility: CLINIC | Age: 43
End: 2023-11-08
Payer: COMMERCIAL

## 2023-11-08 DIAGNOSIS — Z23 ENCOUNTER FOR IMMUNIZATION: Primary | ICD-10-CM

## 2023-11-08 PROCEDURE — 90686 IIV4 VACC NO PRSV 0.5 ML IM: CPT

## 2023-11-08 PROCEDURE — 90471 IMMUNIZATION ADMIN: CPT

## 2024-01-25 DIAGNOSIS — Z12.31 ENCOUNTER FOR SCREENING MAMMOGRAM FOR BREAST CANCER: Primary | ICD-10-CM

## 2024-02-16 ENCOUNTER — HOSPITAL ENCOUNTER (OUTPATIENT)
Dept: RADIOLOGY | Facility: IMAGING CENTER | Age: 44
End: 2024-02-16
Payer: COMMERCIAL

## 2024-02-16 VITALS — WEIGHT: 140 LBS | HEIGHT: 66 IN | BODY MASS INDEX: 22.5 KG/M2

## 2024-02-16 DIAGNOSIS — Z12.31 ENCOUNTER FOR SCREENING MAMMOGRAM FOR BREAST CANCER: ICD-10-CM

## 2024-02-16 PROCEDURE — 77063 BREAST TOMOSYNTHESIS BI: CPT

## 2024-02-16 PROCEDURE — 77067 SCR MAMMO BI INCL CAD: CPT

## 2024-02-21 PROBLEM — Z13.1 DIABETES MELLITUS SCREENING: Status: RESOLVED | Noted: 2018-06-08 | Resolved: 2024-02-21

## 2024-02-21 PROBLEM — Z13.220 SCREENING FOR LIPID DISORDERS: Status: RESOLVED | Noted: 2018-06-08 | Resolved: 2024-02-21

## 2024-03-05 ENCOUNTER — HOSPITAL ENCOUNTER (OUTPATIENT)
Dept: ULTRASOUND IMAGING | Facility: CLINIC | Age: 44
Discharge: HOME | End: 2024-03-05
Payer: COMMERCIAL

## 2024-03-05 DIAGNOSIS — R92.8 ABNORMAL SCREENING MAMMOGRAM: ICD-10-CM

## 2024-03-05 PROCEDURE — 76642 ULTRASOUND BREAST LIMITED: CPT

## 2024-03-07 ENCOUNTER — TELEPHONE (OUTPATIENT)
Dept: FAMILY MEDICINE CLINIC | Facility: CLINIC | Age: 44
End: 2024-03-07

## 2024-03-07 NOTE — TELEPHONE ENCOUNTER
----- Message from José Miguel Ang DO sent at 3/7/2024 12:13 PM EST -----  Please call patient and notify that results of diagnostic ultrasound of the left breast showed benign lymph node.  She is to resume annual screening mammograms for both breasts

## 2024-03-07 NOTE — RESULT ENCOUNTER NOTE
Please call patient and notify that results of diagnostic ultrasound of the left breast showed benign lymph node.  She is to resume annual screening mammograms for both breasts

## 2024-08-14 ENCOUNTER — OFFICE VISIT (OUTPATIENT)
Dept: FAMILY MEDICINE CLINIC | Facility: CLINIC | Age: 44
End: 2024-08-14
Payer: COMMERCIAL

## 2024-08-14 VITALS
OXYGEN SATURATION: 98 % | RESPIRATION RATE: 16 BRPM | HEIGHT: 66 IN | WEIGHT: 154 LBS | DIASTOLIC BLOOD PRESSURE: 68 MMHG | HEART RATE: 70 BPM | SYSTOLIC BLOOD PRESSURE: 118 MMHG | BODY MASS INDEX: 24.75 KG/M2

## 2024-08-14 DIAGNOSIS — Z00.00 PHYSICAL EXAM, ANNUAL: Primary | ICD-10-CM

## 2024-08-14 DIAGNOSIS — E78.2 MIXED HYPERLIPIDEMIA: ICD-10-CM

## 2024-08-14 DIAGNOSIS — Z13.1 DIABETES MELLITUS SCREENING: ICD-10-CM

## 2024-08-14 PROCEDURE — 99396 PREV VISIT EST AGE 40-64: CPT | Performed by: FAMILY MEDICINE

## 2024-08-14 NOTE — ASSESSMENT & PLAN NOTE
Fasting lipid panel ordered.  Patient has been trying to watch dietary intake of fats and cholesterol.  Will contact with results

## 2024-08-14 NOTE — PROGRESS NOTES
"  Subjective:      Patient ID: Amy Gould is a 44 y.o. female.    44-year-old female presents for annual physical examination.  Patient does have history of borderline hyperlipidemia, beta thalassemia minor.  Patient does smoke but is trying to quit once again.  Needs to have screening blood work performed        Past Medical History:   Diagnosis Date   • Viral gastroenteritis     [id number] RESOLVED       Family History   Problem Relation Age of Onset   • No Known Problems Mother    • Prostate cancer Father 69   • No Known Problems Maternal Grandmother    • No Known Problems Maternal Grandfather    • No Known Problems Paternal Grandmother    • No Known Problems Paternal Grandfather    • No Known Problems Brother    • No Known Problems Son    • No Known Problems Son    • No Known Problems Maternal Aunt    • No Known Problems Maternal Aunt        Past Surgical History:   Procedure Laterality Date   • TOOTH EXTRACTION          reports that she has been smoking cigarettes. She has a 20 pack-year smoking history. She has never used smokeless tobacco. She reports current alcohol use. She reports that she does not use drugs.    No current outpatient medications on file.    The following portions of the patient's history were reviewed and updated as appropriate: allergies, current medications, past family history, past medical history, past social history, past surgical history and problem list.    Review of Systems   Constitutional: Negative.    HENT: Negative.     Eyes: Negative.    Respiratory: Negative.     Cardiovascular: Negative.    Gastrointestinal: Negative.    Endocrine: Negative.    Genitourinary: Negative.    Musculoskeletal: Negative.    Skin: Negative.    Allergic/Immunologic: Negative.    Neurological: Negative.    Hematological: Negative.    Psychiatric/Behavioral: Negative.             Objective:    /68   Pulse 70   Resp 16   Ht 5' 6\" (1.676 m)   Wt 69.9 kg (154 lb)   SpO2 98%   BMI 24.86 kg/m² "      Physical Exam  Vitals and nursing note reviewed.   Constitutional:       General: She is not in acute distress.     Appearance: She is well-developed. She is not diaphoretic.   HENT:      Head: Normocephalic and atraumatic.      Right Ear: External ear normal.      Left Ear: External ear normal.      Nose: Nose normal.   Eyes:      Conjunctiva/sclera: Conjunctivae normal.      Pupils: Pupils are equal, round, and reactive to light.   Cardiovascular:      Rate and Rhythm: Normal rate and regular rhythm.      Heart sounds: Normal heart sounds. No murmur heard.  Pulmonary:      Effort: Pulmonary effort is normal.      Breath sounds: Normal breath sounds.   Abdominal:      General: Bowel sounds are normal.      Palpations: Abdomen is soft.   Musculoskeletal:         General: Normal range of motion.      Cervical back: Normal range of motion and neck supple.   Skin:     General: Skin is warm and dry.      Findings: No rash.   Neurological:      Mental Status: She is alert and oriented to person, place, and time.      Deep Tendon Reflexes: Reflexes are normal and symmetric.   Psychiatric:         Behavior: Behavior normal.         Thought Content: Thought content normal.         Judgment: Judgment normal.           No results found for this or any previous visit (from the past 1008 hour(s)).    Assessment/Plan:    Physical exam, annual  Annual physical examination performed    Mixed hyperlipidemia  Fasting lipid panel ordered.  Patient has been trying to watch dietary intake of fats and cholesterol.  Will contact with results    Diabetes mellitus screening  Screening hemoglobin A1c ordered          Problem List Items Addressed This Visit        Other    Diabetes mellitus screening     Screening hemoglobin A1c ordered         Relevant Orders    Hemoglobin A1C    Mixed hyperlipidemia     Fasting lipid panel ordered.  Patient has been trying to watch dietary intake of fats and cholesterol.  Will contact with results          Relevant Orders    Comprehensive metabolic panel    Lipid panel    Physical exam, annual - Primary     Annual physical examination performed         Relevant Orders    CBC and differential    Comprehensive metabolic panel    Hemoglobin A1C    Lipid panel    TSH, 3rd generation with Free T4 reflex

## 2024-08-16 ENCOUNTER — APPOINTMENT (OUTPATIENT)
Dept: LAB | Facility: IMAGING CENTER | Age: 44
End: 2024-08-16
Payer: COMMERCIAL

## 2024-08-16 DIAGNOSIS — E78.2 MIXED HYPERLIPIDEMIA: ICD-10-CM

## 2024-08-16 DIAGNOSIS — Z00.00 PHYSICAL EXAM, ANNUAL: ICD-10-CM

## 2024-08-16 DIAGNOSIS — Z13.1 DIABETES MELLITUS SCREENING: ICD-10-CM

## 2024-08-16 LAB
ALBUMIN SERPL BCG-MCNC: 4.5 G/DL (ref 3.5–5)
ALP SERPL-CCNC: 41 U/L (ref 34–104)
ALT SERPL W P-5'-P-CCNC: 8 U/L (ref 7–52)
ANION GAP SERPL CALCULATED.3IONS-SCNC: 8 MMOL/L (ref 4–13)
AST SERPL W P-5'-P-CCNC: 12 U/L (ref 13–39)
BASOPHILS # BLD AUTO: 0.09 THOUSANDS/ÂΜL (ref 0–0.1)
BASOPHILS NFR BLD AUTO: 1 % (ref 0–1)
BILIRUB SERPL-MCNC: 0.74 MG/DL (ref 0.2–1)
BUN SERPL-MCNC: 10 MG/DL (ref 5–25)
CALCIUM SERPL-MCNC: 9.2 MG/DL (ref 8.4–10.2)
CHLORIDE SERPL-SCNC: 105 MMOL/L (ref 96–108)
CHOLEST SERPL-MCNC: 254 MG/DL
CO2 SERPL-SCNC: 25 MMOL/L (ref 21–32)
CREAT SERPL-MCNC: 0.74 MG/DL (ref 0.6–1.3)
EOSINOPHIL # BLD AUTO: 0.16 THOUSAND/ÂΜL (ref 0–0.61)
EOSINOPHIL NFR BLD AUTO: 2 % (ref 0–6)
ERYTHROCYTE [DISTWIDTH] IN BLOOD BY AUTOMATED COUNT: 17.6 % (ref 11.6–15.1)
EST. AVERAGE GLUCOSE BLD GHB EST-MCNC: 91 MG/DL
GFR SERPL CREATININE-BSD FRML MDRD: 99 ML/MIN/1.73SQ M
GLUCOSE P FAST SERPL-MCNC: 88 MG/DL (ref 65–99)
HBA1C MFR BLD: 4.8 %
HCT VFR BLD AUTO: 40.6 % (ref 34.8–46.1)
HDLC SERPL-MCNC: 46 MG/DL
HGB BLD-MCNC: 12.5 G/DL (ref 11.5–15.4)
IMM GRANULOCYTES # BLD AUTO: 0.06 THOUSAND/UL (ref 0–0.2)
IMM GRANULOCYTES NFR BLD AUTO: 1 % (ref 0–2)
LDLC SERPL CALC-MCNC: 171 MG/DL (ref 0–100)
LYMPHOCYTES # BLD AUTO: 3.38 THOUSANDS/ÂΜL (ref 0.6–4.47)
LYMPHOCYTES NFR BLD AUTO: 31 % (ref 14–44)
MCH RBC QN AUTO: 20.5 PG (ref 26.8–34.3)
MCHC RBC AUTO-ENTMCNC: 30.8 G/DL (ref 31.4–37.4)
MCV RBC AUTO: 66 FL (ref 82–98)
MONOCYTES # BLD AUTO: 1.11 THOUSAND/ÂΜL (ref 0.17–1.22)
MONOCYTES NFR BLD AUTO: 10 % (ref 4–12)
NEUTROPHILS # BLD AUTO: 6.04 THOUSANDS/ÂΜL (ref 1.85–7.62)
NEUTS SEG NFR BLD AUTO: 55 % (ref 43–75)
NONHDLC SERPL-MCNC: 208 MG/DL
NRBC BLD AUTO-RTO: 0 /100 WBCS
PLATELET # BLD AUTO: 344 THOUSANDS/UL (ref 149–390)
PMV BLD AUTO: 10.7 FL (ref 8.9–12.7)
POTASSIUM SERPL-SCNC: 4 MMOL/L (ref 3.5–5.3)
PROT SERPL-MCNC: 7.1 G/DL (ref 6.4–8.4)
RBC # BLD AUTO: 6.11 MILLION/UL (ref 3.81–5.12)
SODIUM SERPL-SCNC: 138 MMOL/L (ref 135–147)
TRIGL SERPL-MCNC: 184 MG/DL
TSH SERPL DL<=0.05 MIU/L-ACNC: 1.32 UIU/ML (ref 0.45–4.5)
WBC # BLD AUTO: 10.84 THOUSAND/UL (ref 4.31–10.16)

## 2024-08-16 PROCEDURE — 85025 COMPLETE CBC W/AUTO DIFF WBC: CPT

## 2024-08-16 PROCEDURE — 36415 COLL VENOUS BLD VENIPUNCTURE: CPT

## 2024-08-16 PROCEDURE — 84443 ASSAY THYROID STIM HORMONE: CPT

## 2024-08-16 PROCEDURE — 80061 LIPID PANEL: CPT

## 2024-08-16 PROCEDURE — 83036 HEMOGLOBIN GLYCOSYLATED A1C: CPT

## 2024-08-16 PROCEDURE — 80053 COMPREHEN METABOLIC PANEL: CPT

## 2024-08-22 ENCOUNTER — TELEPHONE (OUTPATIENT)
Dept: FAMILY MEDICINE CLINIC | Facility: CLINIC | Age: 44
End: 2024-08-22

## 2024-08-22 NOTE — TELEPHONE ENCOUNTER
----- Message from José Miguel Ang DO sent at 8/22/2024  8:54 AM EDT -----  Please call the patient regarding her abnormal result.  Hemoglobin A1c is excellent.  She is not anemic with hemoglobin of 12.5 but does have chronic microcytic, hypochromic indices.  Nothing of any concern.  Cholesterol is still significantly elevated

## 2024-08-26 ENCOUNTER — APPOINTMENT (OUTPATIENT)
Dept: RADIOLOGY | Age: 44
End: 2024-08-26
Payer: COMMERCIAL

## 2024-08-26 ENCOUNTER — OFFICE VISIT (OUTPATIENT)
Dept: URGENT CARE | Age: 44
End: 2024-08-26
Payer: COMMERCIAL

## 2024-08-26 VITALS
OXYGEN SATURATION: 99 % | SYSTOLIC BLOOD PRESSURE: 112 MMHG | RESPIRATION RATE: 18 BRPM | DIASTOLIC BLOOD PRESSURE: 80 MMHG | TEMPERATURE: 98.4 F | HEART RATE: 84 BPM

## 2024-08-26 DIAGNOSIS — S92.025A CLOSED NONDISPLACED FRACTURE OF ANTERIOR PROCESS OF LEFT CALCANEUS, INITIAL ENCOUNTER: Primary | ICD-10-CM

## 2024-08-26 DIAGNOSIS — T14.90XA INJURY: ICD-10-CM

## 2024-08-26 PROCEDURE — 99213 OFFICE O/P EST LOW 20 MIN: CPT | Performed by: STUDENT IN AN ORGANIZED HEALTH CARE EDUCATION/TRAINING PROGRAM

## 2024-08-26 PROCEDURE — 73610 X-RAY EXAM OF ANKLE: CPT

## 2024-08-26 PROCEDURE — 73630 X-RAY EXAM OF FOOT: CPT

## 2024-08-27 NOTE — PROGRESS NOTES
[facility]        NAME: Amy Gould is a 44 y.o. female  : 1980    MRN: [id number]  DATE: 2024  TIME: 8:29 PM    Assessment and Orders   Closed nondisplaced fracture of anterior process of left calcaneus, initial encounter [S92.025A]  1. Closed nondisplaced fracture of anterior process of left calcaneus, initial encounter  Ambulatory Referral to Orthopedic Surgery      2. Injury  XR foot 3+ vw left    XR ankle 3+ vw left            Plan and Discussion      Xray confirms anterior calcaneus fracture. Patient fit with CAM boot and crutches. Should remain non-weight bearing until seen by orthopedics. Referred to orthopedics.     PATIENT INSTRUCTIONS    If tests have been performed at [facility], our office will contact you with results if changes need to be made to the care plan discussed with you at the visit.  [facility]    Follow up with PCP.     If any of the following occur, please report to your nearest ED for evaluation or call 911.   Difficultly breathing or shortness of breath  Chest pain  Acutely worsening symptoms.         Chief Complaint     Chief Complaint   Patient presents with    Ankle Injury    Foot Injury     Patient states she missed last step rolled ankle and foot left side it happen Friday night.         History of Present Illness       Ankle Injury   The incident occurred 3 to 5 days ago. The incident occurred at home. The injury mechanism was an inversion injury (missed a step). The pain is present in the left ankle and left foot. The quality of the pain is described as aching. Associated symptoms include an inability to bear weight, a loss of motion and tingling. Pertinent negatives include no loss of sensation, muscle weakness or numbness. The symptoms are aggravated by palpation, weight bearing and movement.       Review of Systems   Review of Systems   Neurological:  Positive for tingling. Negative for numbness.          Current Medications     No current outpatient medications on file.    Current Allergies     Allergies as of 08/26/2024 - Reviewed 08/26/2024   Allergen Reaction Noted    Pollen extract  05/12/2014            The following portions of the patient's history were reviewed and updated as appropriate: allergies, current medications, past family history, past medical history, past social history, past surgical history and problem list.     Past Medical History:   Diagnosis Date    Viral gastroenteritis     [id number] RESOLVED       Past Surgical History:   Procedure Laterality Date    TOOTH EXTRACTION         Family History   Problem Relation Age of Onset    No Known Problems Mother     Prostate cancer Father 69    No Known Problems Maternal Grandmother     No Known Problems Maternal Grandfather     No Known Problems Paternal Grandmother     No Known Problems Paternal Grandfather     No Known Problems Brother     No Known Problems Son     No Known Problems Son     No Known Problems Maternal Aunt     No Known Problems Maternal Aunt          Medications have been verified.        Objective   /80   Pulse 84   Temp 98.4 °F (36.9 °C) (Tympanic)   Resp 18   LMP 08/22/2024 (Exact Date)   SpO2 99%   Patient's last menstrual period was 08/22/2024 (exact date).       Physical Exam     Physical Exam  Constitutional:       General: She is not in acute distress.  Pulmonary:      Effort: Pulmonary effort is normal. No respiratory distress.      Breath sounds: No wheezing.   Musculoskeletal:         General: Swelling, tenderness and signs of injury present.      Left ankle: No tenderness. Normal range of motion.      Left Achilles Tendon: No tenderness. Ibrahim's test negative.      Left foot: Decreased range of motion. Swelling, tenderness and bony tenderness present. No deformity.        Feet:    Feet:      Comments: Area circled in red is area of swelling and bruising  Neurological:      General: No focal deficit  present.      Mental Status: She is alert and oriented to person, place, and time.      Gait: Gait abnormal.   Psychiatric:         Mood and Affect: Mood normal.         Behavior: Behavior normal.               Thao Stack DO

## 2024-08-29 ENCOUNTER — OFFICE VISIT (OUTPATIENT)
Dept: PODIATRY | Facility: CLINIC | Age: 44
End: 2024-08-29
Payer: COMMERCIAL

## 2024-08-29 VITALS — HEART RATE: 99 BPM | SYSTOLIC BLOOD PRESSURE: 106 MMHG | DIASTOLIC BLOOD PRESSURE: 69 MMHG

## 2024-08-29 DIAGNOSIS — S92.155A CLOSED NONDISPLACED AVULSION FRACTURE OF LEFT TALUS, INITIAL ENCOUNTER: Primary | ICD-10-CM

## 2024-08-29 DIAGNOSIS — S92.025A CLOSED NONDISPLACED FRACTURE OF ANTERIOR PROCESS OF LEFT CALCANEUS, INITIAL ENCOUNTER: ICD-10-CM

## 2024-08-29 PROCEDURE — 99203 OFFICE O/P NEW LOW 30 MIN: CPT | Performed by: PODIATRIST

## 2024-08-29 NOTE — PROGRESS NOTES
Ambulatory Visit  Name: Amy Gould      : 1980      MRN: [id number]  Encounter Provider: Tohmas Nick DPM  Encounter Date: 2024   Encounter department: [facility]    Assessment & Plan   1. Closed nondisplaced avulsion fracture of left talus, initial encounter  2. Closed nondisplaced fracture of anterior process of left calcaneus, initial encounter  -     Ambulatory Referral to Orthopedic Surgery  Diagnosis and options discussed with patient  Patient agreeable to the plan as stated below  Reviewed XR. The avusion fractures are small and she also severely sprained the foot and ankle    Immobilize for 4 weeks, limited WB in CAM boot  PAssive ROM is ok  NSAIDs PRN  RICE protocol  Check 4 weeks    History of Present Illness     Amy Gould is a 44 y.o. female who presents with left ankle injury, she slipped on a step and rolled her left foot and ankle. DOI 2024. A few days later it was still painful so she got an XR. She was told she broke her heel and put in a boot.     Review of Systems  As stated in HPI, otherwise normal    Medical History Reviewed by provider this encounter:  Tobacco  Allergies  Meds  Problems  Med Hx  Surg Hx  Fam Hx        Objective     /69 (BP Location: Right arm, Patient Position: Sitting, Cuff Size: Standard)   Pulse 99   LMP 2024 (Exact Date)     Physical Exam  Vitals reviewed.   Cardiovascular:      Rate and Rhythm: Normal rate.      Pulses: Normal pulses.   Pulmonary:      Effort: Pulmonary effort is normal. No respiratory distress.   Musculoskeletal:         General: Swelling, tenderness and signs of injury (sharp pain around the rearfoot and ankle left. Mainly pain at CC and lateral ankle. PAssive ankle ORM without pain.) present. No deformity.   Skin:     General: Skin is warm.      Findings: Bruising present. No erythema or rash.   Neurological:      Mental Status: She is alert.      Sensory: No sensory deficit.        Administrative Statements

## 2024-09-13 PROBLEM — Z13.1 DIABETES MELLITUS SCREENING: Status: RESOLVED | Noted: 2018-06-08 | Resolved: 2024-09-13

## 2024-10-10 ENCOUNTER — OFFICE VISIT (OUTPATIENT)
Dept: PODIATRY | Facility: CLINIC | Age: 44
End: 2024-10-10
Payer: COMMERCIAL

## 2024-10-10 DIAGNOSIS — S92.025A CLOSED NONDISPLACED FRACTURE OF ANTERIOR PROCESS OF LEFT CALCANEUS, INITIAL ENCOUNTER: Primary | ICD-10-CM

## 2024-10-10 DIAGNOSIS — S92.155A CLOSED NONDISPLACED AVULSION FRACTURE OF LEFT TALUS, INITIAL ENCOUNTER: ICD-10-CM

## 2024-10-10 PROCEDURE — 99212 OFFICE O/P EST SF 10 MIN: CPT | Performed by: PODIATRIST

## 2024-10-10 NOTE — PROGRESS NOTES
Assessment/Plan:      Diagnoses and all orders for this visit:    Closed nondisplaced fracture of anterior process of left calcaneus, initial encounter    Closed nondisplaced avulsion fracture of left talus, initial encounter      She has no pain or handicap on exam. Advance as tolerated. No need to reimage    Subjective:     Patient ID: Amy Gould is a 44 y.o. female.    Followup left foot sprain and small calcaneal avulsion fracture. She says 2 weeks ago she stopped wearing the boot and has no pain. She is in flipflops today.         Review of Systems      Objective:     Physical Exam  Vitals reviewed.   Cardiovascular:      Pulses: Normal pulses.   Musculoskeletal:         General: No swelling, tenderness or deformity.   Skin:     Findings: No bruising or erythema.         Leftt foot and ankle exam:  NO CC or TN joint pain. No lateral ankle pain  No instability  Normal drawer  NOrmal MMT  Normal LLE exam

## 2024-10-25 ENCOUNTER — IMMUNIZATIONS (OUTPATIENT)
Dept: FAMILY MEDICINE CLINIC | Facility: CLINIC | Age: 44
End: 2024-10-25

## 2024-10-25 DIAGNOSIS — Z23 ENCOUNTER FOR IMMUNIZATION: Primary | ICD-10-CM

## 2024-11-19 ENCOUNTER — TELEPHONE (OUTPATIENT)
Age: 44
End: 2024-11-19

## 2024-11-19 DIAGNOSIS — Z12.31 SCREENING MAMMOGRAM, ENCOUNTER FOR: Primary | ICD-10-CM

## 2024-11-19 NOTE — TELEPHONE ENCOUNTER
Spoke with patient, patient is requesting to get a script for the mammogram with the CAD. Please advise.

## 2025-02-18 ENCOUNTER — HOSPITAL ENCOUNTER (OUTPATIENT)
Dept: RADIOLOGY | Facility: IMAGING CENTER | Age: 45
Discharge: HOME | End: 2025-02-18
Payer: COMMERCIAL

## 2025-02-18 VITALS — WEIGHT: 153 LBS | HEIGHT: 66 IN | BODY MASS INDEX: 24.59 KG/M2

## 2025-02-18 DIAGNOSIS — Z12.31 SCREENING MAMMOGRAM, ENCOUNTER FOR: ICD-10-CM

## 2025-02-18 PROCEDURE — 77063 BREAST TOMOSYNTHESIS BI: CPT

## 2025-02-18 PROCEDURE — 77067 SCR MAMMO BI INCL CAD: CPT

## 2025-02-20 ENCOUNTER — RESULTS FOLLOW-UP (OUTPATIENT)
Dept: FAMILY MEDICINE CLINIC | Facility: CLINIC | Age: 45
End: 2025-02-20

## 2025-04-15 ENCOUNTER — OFFICE VISIT (OUTPATIENT)
Dept: URGENT CARE | Age: 45
End: 2025-04-15
Payer: COMMERCIAL

## 2025-04-15 VITALS
OXYGEN SATURATION: 99 % | HEIGHT: 66 IN | DIASTOLIC BLOOD PRESSURE: 68 MMHG | SYSTOLIC BLOOD PRESSURE: 130 MMHG | BODY MASS INDEX: 24.59 KG/M2 | TEMPERATURE: 98.1 F | HEART RATE: 87 BPM | WEIGHT: 153 LBS | RESPIRATION RATE: 18 BRPM

## 2025-04-15 DIAGNOSIS — J06.9 ACUTE URI: ICD-10-CM

## 2025-04-15 DIAGNOSIS — J02.9 SORE THROAT: Primary | ICD-10-CM

## 2025-04-15 LAB — S PYO AG THROAT QL: NEGATIVE

## 2025-04-15 PROCEDURE — 87880 STREP A ASSAY W/OPTIC: CPT

## 2025-04-15 PROCEDURE — 99213 OFFICE O/P EST LOW 20 MIN: CPT

## 2025-04-15 NOTE — PROGRESS NOTES
[facility]        NAME: Amy Gould is a 44 y.o. female  : 1980    MRN: [id number]  DATE: April 15, 2025  TIME: 12:34 PM    Assessment and Plan   Sore throat [J02.9]  1. Sore throat  POCT rapid ANTIGEN strepA      2. Acute URI  amoxicillin-clavulanate (AUGMENTIN) 875-125 mg per tablet        URI symptoms for 10 days. Taking OTC meds without relief. Sinus pressure/ ear pain     Patient Instructions       Follow up with PCP in 3-5 days.  Proceed to  ER if symptoms worsen.    If tests have been performed at [facility], our office will contact you with results if changes need to be made to the care plan discussed with you at the visit.  [facility]    Chief Complaint     Chief Complaint   Patient presents with    Cold Like Symptoms     Pt is here with a sore throat Sx onset 25. Pt also has chest congestion diarrhea, cough and a headache.          History of Present Illness       URI symptoms for 10 days. Taking OTC meds without relief. Sinus pressure/ ear pain         Review of Systems   Review of Systems   Constitutional:  Negative for activity change and fever.   HENT:  Positive for congestion, postnasal drip and sore throat.    Respiratory:  Positive for cough. Negative for shortness of breath and wheezing.          Current Medications       Current Outpatient Medications:     amoxicillin-clavulanate (AUGMENTIN) 875-125 mg per tablet, Take 1 tablet by mouth every 12 (twelve) hours for 7 days, Disp: 14 tablet, Rfl: 0    Current Allergies     Allergies as of 04/15/2025 - Reviewed 04/15/2025   Allergen Reaction Noted    Pollen extract  2014            The following portions of the patient's history were reviewed and updated as appropriate: allergies, current medications, past family history, past medical history, past social history, past surgical history and problem list.     Past Medical History:   Diagnosis Date    Viral gastroenteritis      "[id number] RESOLVED       Past Surgical History:   Procedure Laterality Date    TOOTH EXTRACTION         Family History   Problem Relation Age of Onset    No Known Problems Mother     Prostate cancer Father 69    No Known Problems Maternal Grandmother     No Known Problems Maternal Grandfather     No Known Problems Paternal Grandmother     No Known Problems Paternal Grandfather     No Known Problems Brother     No Known Problems Son     No Known Problems Son     No Known Problems Maternal Aunt     No Known Problems Maternal Aunt          Medications have been verified.        Objective   /68   Pulse 87   Temp 98.1 °F (36.7 °C)   Resp 18   Ht 5' 6\" (1.676 m)   Wt 69.4 kg (153 lb)   LMP 04/06/2025 (Approximate)   SpO2 99%   BMI 24.69 kg/m²   Patient's last menstrual period was 04/06/2025 (approximate).       Physical Exam     Physical Exam  Vitals reviewed.   HENT:      Right Ear: A middle ear effusion is present.      Nose: Congestion and rhinorrhea present.      Mouth/Throat:      Pharynx: No posterior oropharyngeal erythema.   Cardiovascular:      Rate and Rhythm: Normal rate and regular rhythm.      Pulses: Normal pulses.      Heart sounds: Normal heart sounds.   Pulmonary:      Effort: Pulmonary effort is normal.      Breath sounds: Normal breath sounds.   Lymphadenopathy:      Cervical: No cervical adenopathy.                   "

## 2025-07-28 ENCOUNTER — OFFICE VISIT (OUTPATIENT)
Dept: FAMILY MEDICINE CLINIC | Facility: CLINIC | Age: 45
End: 2025-07-28
Payer: COMMERCIAL

## 2025-07-28 ENCOUNTER — APPOINTMENT (OUTPATIENT)
Dept: LAB | Facility: CLINIC | Age: 45
End: 2025-07-28
Payer: COMMERCIAL

## 2025-07-28 VITALS
HEIGHT: 66 IN | DIASTOLIC BLOOD PRESSURE: 70 MMHG | WEIGHT: 162 LBS | BODY MASS INDEX: 26.03 KG/M2 | RESPIRATION RATE: 16 BRPM | SYSTOLIC BLOOD PRESSURE: 120 MMHG | OXYGEN SATURATION: 99 % | HEART RATE: 83 BPM

## 2025-07-28 DIAGNOSIS — D50.9 MICROCYTIC HYPOCHROMIC ANEMIA: ICD-10-CM

## 2025-07-28 DIAGNOSIS — E78.2 MIXED HYPERLIPIDEMIA: ICD-10-CM

## 2025-07-28 DIAGNOSIS — E04.1 THYROID NODULE: ICD-10-CM

## 2025-07-28 DIAGNOSIS — Z12.31 ENCOUNTER FOR SCREENING MAMMOGRAM FOR BREAST CANCER: ICD-10-CM

## 2025-07-28 DIAGNOSIS — Z13.6 SCREENING, HEART DISEASE, ISCHEMIC: Primary | ICD-10-CM

## 2025-07-28 DIAGNOSIS — Z13.1 DIABETES MELLITUS SCREENING: ICD-10-CM

## 2025-07-28 DIAGNOSIS — Z13.220 SCREENING FOR LIPID DISORDERS: ICD-10-CM

## 2025-07-28 DIAGNOSIS — Z00.00 PHYSICAL EXAM, ANNUAL: Primary | ICD-10-CM

## 2025-07-28 LAB
ALBUMIN SERPL BCG-MCNC: 4.3 G/DL (ref 3.5–5)
ALP SERPL-CCNC: 54 U/L (ref 34–104)
ALT SERPL W P-5'-P-CCNC: 7 U/L (ref 7–52)
ANION GAP SERPL CALCULATED.3IONS-SCNC: 8 MMOL/L (ref 4–13)
AST SERPL W P-5'-P-CCNC: 13 U/L (ref 13–39)
BASOPHILS # BLD AUTO: 0.08 THOUSANDS/ÂΜL (ref 0–0.1)
BASOPHILS NFR BLD AUTO: 1 % (ref 0–1)
BILIRUB SERPL-MCNC: 0.55 MG/DL (ref 0.2–1)
BUN SERPL-MCNC: 9 MG/DL (ref 5–25)
CALCIUM SERPL-MCNC: 9.2 MG/DL (ref 8.4–10.2)
CHLORIDE SERPL-SCNC: 105 MMOL/L (ref 96–108)
CHOLEST SERPL-MCNC: 280 MG/DL (ref ?–200)
CO2 SERPL-SCNC: 26 MMOL/L (ref 21–32)
CREAT SERPL-MCNC: 0.72 MG/DL (ref 0.6–1.3)
EOSINOPHIL # BLD AUTO: 0.11 THOUSAND/ÂΜL (ref 0–0.61)
EOSINOPHIL NFR BLD AUTO: 1 % (ref 0–6)
ERYTHROCYTE [DISTWIDTH] IN BLOOD BY AUTOMATED COUNT: 17.3 % (ref 11.6–15.1)
EST. AVERAGE GLUCOSE BLD GHB EST-MCNC: 97 MG/DL
GFR SERPL CREATININE-BSD FRML MDRD: 102 ML/MIN/1.73SQ M
GLUCOSE P FAST SERPL-MCNC: 86 MG/DL (ref 65–99)
HBA1C MFR BLD: 5 %
HCT VFR BLD AUTO: 41.9 % (ref 34.8–46.1)
HDLC SERPL-MCNC: 42 MG/DL
HGB BLD-MCNC: 12.8 G/DL (ref 11.5–15.4)
IMM GRANULOCYTES # BLD AUTO: 0.08 THOUSAND/UL (ref 0–0.2)
IMM GRANULOCYTES NFR BLD AUTO: 1 % (ref 0–2)
LDLC SERPL CALC-MCNC: 200 MG/DL (ref 0–100)
LYMPHOCYTES # BLD AUTO: 2.41 THOUSANDS/ÂΜL (ref 0.6–4.47)
LYMPHOCYTES NFR BLD AUTO: 27 % (ref 14–44)
MCH RBC QN AUTO: 20.1 PG (ref 26.8–34.3)
MCHC RBC AUTO-ENTMCNC: 30.5 G/DL (ref 31.4–37.4)
MCV RBC AUTO: 66 FL (ref 82–98)
MONOCYTES # BLD AUTO: 0.74 THOUSAND/ÂΜL (ref 0.17–1.22)
MONOCYTES NFR BLD AUTO: 8 % (ref 4–12)
NEUTROPHILS # BLD AUTO: 5.57 THOUSANDS/ÂΜL (ref 1.85–7.62)
NEUTS SEG NFR BLD AUTO: 62 % (ref 43–75)
NONHDLC SERPL-MCNC: 238 MG/DL
NRBC BLD AUTO-RTO: 0 /100 WBCS
PLATELET # BLD AUTO: 363 THOUSANDS/UL (ref 149–390)
PMV BLD AUTO: 10.9 FL (ref 8.9–12.7)
POTASSIUM SERPL-SCNC: 4.5 MMOL/L (ref 3.5–5.3)
PROT SERPL-MCNC: 7.1 G/DL (ref 6.4–8.4)
RBC # BLD AUTO: 6.36 MILLION/UL (ref 3.81–5.12)
SODIUM SERPL-SCNC: 139 MMOL/L (ref 135–147)
TRIGL SERPL-MCNC: 190 MG/DL (ref ?–150)
TSH SERPL DL<=0.05 MIU/L-ACNC: 1.1 UIU/ML (ref 0.45–4.5)
WBC # BLD AUTO: 8.99 THOUSAND/UL (ref 4.31–10.16)

## 2025-07-28 PROCEDURE — 84443 ASSAY THYROID STIM HORMONE: CPT | Performed by: FAMILY MEDICINE

## 2025-07-28 PROCEDURE — 80061 LIPID PANEL: CPT | Performed by: FAMILY MEDICINE

## 2025-07-28 PROCEDURE — 99396 PREV VISIT EST AGE 40-64: CPT | Performed by: FAMILY MEDICINE

## 2025-07-28 PROCEDURE — 85025 COMPLETE CBC W/AUTO DIFF WBC: CPT | Performed by: FAMILY MEDICINE

## 2025-07-28 PROCEDURE — 80053 COMPREHEN METABOLIC PANEL: CPT | Performed by: FAMILY MEDICINE

## 2025-07-28 PROCEDURE — 36415 COLL VENOUS BLD VENIPUNCTURE: CPT | Performed by: FAMILY MEDICINE

## 2025-07-28 PROCEDURE — 83036 HEMOGLOBIN GLYCOSYLATED A1C: CPT | Performed by: FAMILY MEDICINE

## 2025-08-14 ENCOUNTER — HOSPITAL ENCOUNTER (OUTPATIENT)
Dept: CT IMAGING | Facility: HOSPITAL | Age: 45
Discharge: HOME | End: 2025-08-14
Attending: FAMILY MEDICINE
Payer: COMMERCIAL

## 2025-08-27 PROBLEM — Z13.6 SCREENING, HEART DISEASE, ISCHEMIC: Status: RESOLVED | Noted: 2025-07-28 | Resolved: 2025-08-27
